# Patient Record
Sex: FEMALE | Race: ASIAN | NOT HISPANIC OR LATINO | Employment: FULL TIME | URBAN - METROPOLITAN AREA
[De-identification: names, ages, dates, MRNs, and addresses within clinical notes are randomized per-mention and may not be internally consistent; named-entity substitution may affect disease eponyms.]

---

## 2022-04-24 ENCOUNTER — HOSPITAL ENCOUNTER (EMERGENCY)
Facility: MEDICAL CENTER | Age: 27
End: 2022-04-25
Attending: EMERGENCY MEDICINE
Payer: COMMERCIAL

## 2022-04-24 DIAGNOSIS — R56.9 SEIZURE-LIKE ACTIVITY (HCC): ICD-10-CM

## 2022-04-24 DIAGNOSIS — G40.909 SEIZURE DISORDER (HCC): ICD-10-CM

## 2022-04-24 LAB
ALBUMIN SERPL BCP-MCNC: 4.9 G/DL (ref 3.2–4.9)
ALBUMIN/GLOB SERPL: 1.8 G/DL
ALP SERPL-CCNC: 53 U/L (ref 30–99)
ALT SERPL-CCNC: 7 U/L (ref 2–50)
ANION GAP SERPL CALC-SCNC: 15 MMOL/L (ref 7–16)
AST SERPL-CCNC: 16 U/L (ref 12–45)
BASOPHILS # BLD AUTO: 0.8 % (ref 0–1.8)
BASOPHILS # BLD: 0.07 K/UL (ref 0–0.12)
BILIRUB SERPL-MCNC: 0.2 MG/DL (ref 0.1–1.5)
BUN SERPL-MCNC: 11 MG/DL (ref 8–22)
CALCIUM SERPL-MCNC: 9.4 MG/DL (ref 8.5–10.5)
CHLORIDE SERPL-SCNC: 109 MMOL/L (ref 96–112)
CO2 SERPL-SCNC: 20 MMOL/L (ref 20–33)
CREAT SERPL-MCNC: 0.76 MG/DL (ref 0.5–1.4)
EOSINOPHIL # BLD AUTO: 0.16 K/UL (ref 0–0.51)
EOSINOPHIL NFR BLD: 1.9 % (ref 0–6.9)
ERYTHROCYTE [DISTWIDTH] IN BLOOD BY AUTOMATED COUNT: 42.8 FL (ref 35.9–50)
ETHANOL BLD-MCNC: 140.8 MG/DL (ref 0–10)
GFR SERPLBLD CREATININE-BSD FMLA CKD-EPI: 110 ML/MIN/1.73 M 2
GLOBULIN SER CALC-MCNC: 2.8 G/DL (ref 1.9–3.5)
GLUCOSE SERPL-MCNC: 91 MG/DL (ref 65–99)
HCT VFR BLD AUTO: 41.3 % (ref 37–47)
HGB BLD-MCNC: 13.7 G/DL (ref 12–16)
IMM GRANULOCYTES # BLD AUTO: 0.02 K/UL (ref 0–0.11)
IMM GRANULOCYTES NFR BLD AUTO: 0.2 % (ref 0–0.9)
LACTATE BLD-SCNC: 2.6 MMOL/L (ref 0.5–2)
LYMPHOCYTES # BLD AUTO: 3.49 K/UL (ref 1–4.8)
LYMPHOCYTES NFR BLD: 41.3 % (ref 22–41)
MCH RBC QN AUTO: 30.1 PG (ref 27–33)
MCHC RBC AUTO-ENTMCNC: 33.2 G/DL (ref 33.6–35)
MCV RBC AUTO: 90.8 FL (ref 81.4–97.8)
MONOCYTES # BLD AUTO: 0.34 K/UL (ref 0–0.85)
MONOCYTES NFR BLD AUTO: 4 % (ref 0–13.4)
NEUTROPHILS # BLD AUTO: 4.37 K/UL (ref 2–7.15)
NEUTROPHILS NFR BLD: 51.8 % (ref 44–72)
NRBC # BLD AUTO: 0 K/UL
NRBC BLD-RTO: 0 /100 WBC
PLATELET # BLD AUTO: 376 K/UL (ref 164–446)
PMV BLD AUTO: 10 FL (ref 9–12.9)
POTASSIUM SERPL-SCNC: 3.7 MMOL/L (ref 3.6–5.5)
PROT SERPL-MCNC: 7.7 G/DL (ref 6–8.2)
RBC # BLD AUTO: 4.55 M/UL (ref 4.2–5.4)
SODIUM SERPL-SCNC: 144 MMOL/L (ref 135–145)
WBC # BLD AUTO: 8.5 K/UL (ref 4.8–10.8)

## 2022-04-24 PROCEDURE — 83605 ASSAY OF LACTIC ACID: CPT

## 2022-04-24 PROCEDURE — A9270 NON-COVERED ITEM OR SERVICE: HCPCS | Performed by: EMERGENCY MEDICINE

## 2022-04-24 PROCEDURE — 82077 ASSAY SPEC XCP UR&BREATH IA: CPT

## 2022-04-24 PROCEDURE — 80053 COMPREHEN METABOLIC PANEL: CPT

## 2022-04-24 PROCEDURE — 99285 EMERGENCY DEPT VISIT HI MDM: CPT

## 2022-04-24 PROCEDURE — 85025 COMPLETE CBC W/AUTO DIFF WBC: CPT

## 2022-04-24 PROCEDURE — 36415 COLL VENOUS BLD VENIPUNCTURE: CPT

## 2022-04-24 PROCEDURE — 700102 HCHG RX REV CODE 250 W/ 637 OVERRIDE(OP): Performed by: EMERGENCY MEDICINE

## 2022-04-24 RX ORDER — LEVETIRACETAM 500 MG/1
2000 TABLET ORAL ONCE
Status: COMPLETED | OUTPATIENT
Start: 2022-04-24 | End: 2022-04-24

## 2022-04-24 RX ORDER — LEVETIRACETAM 250 MG/1
750 TABLET ORAL 2 TIMES DAILY
Status: SHIPPED | COMMUNITY
End: 2022-04-24 | Stop reason: SDUPTHER

## 2022-04-24 RX ORDER — LEVETIRACETAM 750 MG/1
750 TABLET ORAL 2 TIMES DAILY
Qty: 120 TABLET | Refills: 0 | Status: SHIPPED | OUTPATIENT
Start: 2022-04-24 | End: 2022-06-23

## 2022-04-24 RX ADMIN — LEVETIRACETAM 2000 MG: 500 TABLET, FILM COATED ORAL at 22:36

## 2022-04-25 ENCOUNTER — TELEPHONE (OUTPATIENT)
Dept: SCHEDULING | Facility: IMAGING CENTER | Age: 27
End: 2022-04-25

## 2022-04-25 VITALS
HEART RATE: 80 BPM | TEMPERATURE: 98 F | RESPIRATION RATE: 25 BRPM | OXYGEN SATURATION: 95 % | DIASTOLIC BLOOD PRESSURE: 65 MMHG | SYSTOLIC BLOOD PRESSURE: 95 MMHG | BODY MASS INDEX: 19.99 KG/M2 | HEIGHT: 65 IN | WEIGHT: 120 LBS

## 2022-04-25 NOTE — ED TRIAGE NOTES
"Chief Complaint   Patient presents with   • Seizure     Known seizure history, had a seizure tonight. Friend or family called 911, EMS reports patient post-ictal on their arrival.   Patient states she takes keppra but ran out of it 1 month ago.     ED Triage Vitals [04/24/22 2215]   Enc Vitals Group      Blood Pressure 119/73      Pulse 84      Respiration 18      Temperature 37 °C (98.6 °F)      Temp src Temporal      Pulse Oximetry 97 %      Weight 54.4 kg (120 lb)      Height 1.651 m (5' 5\")     Patient is currently A/Ox4. Slow to respond to questions but converses appropriately with RN otherwise. States that she also has had \"a lot\" of alcohol to drink tonight.  EMS reports family is on the way.  "

## 2022-04-25 NOTE — ED PROVIDER NOTES
ED Provider Note    CHIEF COMPLAINT  Chief Complaint   Patient presents with   • Seizure     Known seizure history, had a seizure tonight. Friend or family called 911, EMS reports patient post-ictal on their arrival.   Patient states she takes keppra but ran out of it 1 month ago.       HPI  Patient is a 26-year-old female with a history of seizure disorder not currently on antiepileptics who presents emergency room brought in by EMS personnel for apparent seizure-like activity.  Patient was apparently postictal on arrival of EMS personnel and a friend or family member called 911.  They were out drinking and she states that she used to take Keppra however ran out of it 1 month ago and has just relocated back to [place].  She is not currently established with a physician and had not gotten her Keppra represcribed.  Tonight she is unaware of any new traumatic injuries, she has no persistent headaches, chest pain and denies any exogenous drug use but does endorse drinking alcohol.  She is currently is having no pain complaints, she denies any other contributing factors or prodromal symptoms such as chest pain, shortness of breath or other preceding signals of her seizure.  She says this feels similar to when she had prior seizures and is interested in establishing with a physician and neurologist here in town that she has not been in [place] in approximately 5 years.    PPE Note: I personally donned full PPE for all patient encounters during this visit, including being clean-shaven with an N95 respirator mask, gloves, and goggles.     REVIEW OF SYSTEMS  See HPI for further details. All other systems are negative.     PAST MEDICAL HISTORY   has a past medical history of Seizure (HCC).    SOCIAL HISTORY  Social History     Tobacco Use   • Smoking status: Never Smoker   • Smokeless tobacco: Never Used   Substance and Sexual Activity   • Alcohol use: Yes     Comment: occasional   • Drug use: Never   • Sexual activity: Not on  "file     SURGICAL HISTORY  patient denies any surgical history    CURRENT MEDICATIONS  Home Medications     Reviewed by Jeremias Larson R.N. (Registered Nurse) on 04/24/22 at 2218  Med List Status: Complete   Medication Last Dose Status   levETIRAcetam (KEPPRA) 250 MG tablet 1 month Active              ALLERGIES  No Known Allergies    PHYSICAL EXAM  VITAL SIGNS: /61   Pulse 81   Temp 36.7 °C (98 °F) (Temporal)   Resp (!) 25   Ht 1.651 m (5' 5\")   Wt 54.4 kg (120 lb)   LMP 04/24/2022 (Within Days)   SpO2 92%   BMI 19.97 kg/m²   Pulse ox interpretation: I interpret this pulse ox as normal.  Genl: F sitting in chair comfortably, speaking clearly, appears in no acute distress  Head: NC/AT   ENT: Mucous membranes moist, posterior pharynx clear, uvula midline, nares patent bilaterally   Eyes: Normal sclera, pupils equal round reactive to light  Neck: Supple, FROM, no LAD appreciated  Pulmonary: Lungs are clear to auscultation bilaterally  Chest: No TTP  CV:  RRR, no murmur appreciated, pulses 2+ in both upper and lower extremities,  Abdomen: soft, NT/ND; no rebound/guarding, no masses palpated, no HSM  : no CVA or suprapubic tenderness  Musculoskeletal: Pain free ROM of the neck. Moving upper and lower extremities and spontaneous in coordinated fashion  Neuro: Mental Status: Speech fluent without errors. Follows all commands. No dysarthria or apraxia.  Cranial Nerves: Pupils equal round and reactive to light. Extraocular motion intact. Visual fields intact. No nystagmus. CN V1-V3 intact to light touch. No facial asymmetry. Hearing clinically intact bilaterally. Tongue protrusion midline. No uvular deviation. Normal shoulder shrug and head turn.  Motor:  RUE: 5/5 with hand , 5/5 with flexion at the elbow 5/5 with extension at the elbow  LUE: 5/5 with hand , 5/5 with flexion at the elbow 5/5 with extension at the elbow  RLE: 5/5 with leg raise, 5/5 with plantar flexion, 5/5 with dorsal " flexion  LLE: 5/5 with leg raise, 5/5 with plantar flexion, 5/5 with dorsal flexion  Sensation to light touch intact throughout, Reflexes 2+ Patellar tendons, No ataxia noted  Rapidly alternating movements without difficulty  Skin: No rash or lesions.  No pallor or jaundice.  No cyanosis.  Warm and dry.     DIAGNOSTIC STUDIES / PROCEDURES    LABS  Labs Reviewed   CBC WITH DIFFERENTIAL - Abnormal; Notable for the following components:       Result Value    MCHC 33.2 (*)     Lymphocytes 41.30 (*)     All other components within normal limits   LACTIC ACID - Abnormal; Notable for the following components:    Lactic Acid 2.6 (*)     All other components within normal limits   DIAGNOSTIC ALCOHOL - Abnormal; Notable for the following components:    Diagnostic Alcohol 140.8 (*)     All other components within normal limits   COMP METABOLIC PANEL   ESTIMATED GFR     RADIOLOGY  No orders to display     COURSE & MEDICAL DECISION MAKING  Pertinent Labs & Imaging studies reviewed. (See chart for details)    DDX:  Seizure: r/o breakthrough seizure, Psychogenic nonepileptic seizure, subtherapeutic  anticonvulsant levels, medication noncompliance, hypoglycemia, hyponatremia, metabolic abnormality    MDM    Initial evaluation at 2222:  Patient seen and evaluated for symptoms as described above.  She has stable vital signs, no recent fevers or reported illness and has a known history of seizure disorder.  She has been out drinking and there is no corroborating evidence for any recent trauma though none is readily apparent on clinical exam.  Initial lactate was noted to be elevated which could be consistent with either alcohol intoxication or possible seizure activity.  She was reported to be postictal and clearing and clinically this appears appropriate.  Lab work obtained for the broad differential as noted above.  At this time with no evidence of trauma established history and current noncompliance with her Keppra medication I do  not think advanced medical imaging is indicated.  She will be loaded with Keppra p.o., follow-up on labs and referrals will be placed for establishment of care    2330: Lab work is reviewed, lactate level slightly elevated at 2.6, consistent with possible seizure.  She does have an elevated alcohol level without drinking with friends.  During her period of observation she is allowed to clear and does not show any signs of clinical intoxication thereafter.  No gross electrolyte derangements, no concerning anemia or hypoglycemia.  Family member came to bedside, and does not have other ongoing concerns and will help the patient get her prescriptions and take her to appointments.  Questions are addressed and they feel comfortable going home at this time.    FINAL IMPRESSION  Visit Diagnoses     ICD-10-CM   1. Seizure-like activity (HCC)  R56.9   2. Seizure disorder (HCC)  G40.909     Electronically signed by: Sergio Berumen M.D., 4/24/2022 10:22 PM

## 2022-04-25 NOTE — ED NOTES
Written and verbal discharge instructions given to patient. Patient acknowledges and reports understanding of instructions.  Patient is agreeable to discharge at this time.  D/c to home with sister.

## 2022-04-29 ENCOUNTER — OFFICE VISIT (OUTPATIENT)
Dept: MEDICAL GROUP | Facility: IMAGING CENTER | Age: 27
End: 2022-04-29
Attending: EMERGENCY MEDICINE
Payer: COMMERCIAL

## 2022-04-29 VITALS
HEART RATE: 111 BPM | HEIGHT: 65 IN | OXYGEN SATURATION: 96 % | DIASTOLIC BLOOD PRESSURE: 52 MMHG | WEIGHT: 126 LBS | TEMPERATURE: 98.4 F | SYSTOLIC BLOOD PRESSURE: 98 MMHG | BODY MASS INDEX: 20.99 KG/M2

## 2022-04-29 DIAGNOSIS — G40.909 SEIZURE DISORDER (HCC): ICD-10-CM

## 2022-04-29 DIAGNOSIS — L98.8 SKIN LESION OF BREAST: ICD-10-CM

## 2022-04-29 DIAGNOSIS — Z13.79 GENETIC TESTING: ICD-10-CM

## 2022-04-29 DIAGNOSIS — N63.0 LUMP OR MASS IN BREAST: ICD-10-CM

## 2022-04-29 DIAGNOSIS — Z76.89 ENCOUNTER TO ESTABLISH CARE: ICD-10-CM

## 2022-04-29 PROBLEM — R56.9 SEIZURE (HCC): Status: ACTIVE | Noted: 2022-04-29

## 2022-04-29 PROCEDURE — 99204 OFFICE O/P NEW MOD 45 MIN: CPT

## 2022-04-29 ASSESSMENT — ANXIETY QUESTIONNAIRES
7. FEELING AFRAID AS IF SOMETHING AWFUL MIGHT HAPPEN: NOT AT ALL
1. FEELING NERVOUS, ANXIOUS, OR ON EDGE: NOT AT ALL
2. NOT BEING ABLE TO STOP OR CONTROL WORRYING: NOT AT ALL
6. BECOMING EASILY ANNOYED OR IRRITABLE: NOT AT ALL
3. WORRYING TOO MUCH ABOUT DIFFERENT THINGS: NOT AT ALL
4. TROUBLE RELAXING: NOT AT ALL
GAD7 TOTAL SCORE: 0
5. BEING SO RESTLESS THAT IT IS HARD TO SIT STILL: NOT AT ALL

## 2022-04-29 ASSESSMENT — FIBROSIS 4 INDEX: FIB4 SCORE: 0.43

## 2022-04-29 ASSESSMENT — PAIN SCALES - GENERAL: PAINLEVEL: NO PAIN

## 2022-04-29 ASSESSMENT — PATIENT HEALTH QUESTIONNAIRE - PHQ9: CLINICAL INTERPRETATION OF PHQ2 SCORE: 0

## 2022-04-29 NOTE — PROGRESS NOTES
" CC:  Establish care and follow up after ED visit    HISTORY OF THE PRESENT ILLNESS: Patient is a 26 y.o. female. This pleasant patient is here today to establish care, follow-up after ED visit, and to discuss:    Establish care  Patient is here to establish care. She recently moved back here from [place] 2 months ago.    Seizure disorder  Patient reports to having epilepsy/seizures since age 17. She takes Keppra for this but admits to inconsistent use of med stating that she would at times only take half the dose prescribed, forgets to take them, or will have to distribute the dose in order to last prior to running out. She has not been taking her Keppra for 1 month which resulted in an ED visit on 4/24 for seizure activity. She was prescribed Keppra 750 mg BID. She reports that current dose might be too much stating \"my head feels heavy and groggy.\" She has pending referral to Neurology.    Breast lumps on both breasts  Skin lesion of breast  Patient reports of being told of having with \"breast lumps\" on both breast at age 17. She was informed that it was benign but was recommended to have follow up ultrasound for surveillance. She has not done so since. Today, she reports of noticing \"white drainage\" on her sports bra from both breasts. She also reports of bilateral aerola pigmentation darkening since birth.   Patient has noticed a \"dark spot on her left nipple\" 4 years that appears to be increasing in size.   She reports to having fevers and chills frequently attributing this from her child being sick all the time.   She denies breast pain, redness, swelling, malaise, unintentional weight changes.     US Breast result 4/16/2014  Impression  1.  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   2.  Treatments options include short-term followup, biopsy, or surgical removal.   3.  Tentatively short-term ultrasound followup in 6 months is recommended.   These results were given to the patient at the " time of visit.   R3 - Category 3:  Probably Benign Finding - Short Interval Follow-Up Suggested    Allergies: Patient has no known allergies.    Current Outpatient Medications Ordered in Epic   Medication Sig Dispense Refill   • levETIRAcetam (KEPPRA) 750 MG tablet Take 1 Tablet by mouth 2 times a day for 60 days. 120 Tablet 0     No current Epic-ordered facility-administered medications on file.       Past Medical History:   Diagnosis Date   • Epilepsy (HCC)    • Seizure (HCC)        History reviewed. No pertinent surgical history.    Social History     Tobacco Use   • Smoking status: Former Smoker     Quit date: 2018     Years since quittin.0   • Smokeless tobacco: Never Used   • Tobacco comment: quit about 5 years ago   Vaping Use   • Vaping Use: Every day   • Substances: Flavoring   Substance Use Topics   • Alcohol use: Yes     Comment: occasional   • Drug use: Never       Social History     Social History Narrative   • Not on file       Family History   Problem Relation Age of Onset   • Cancer Mother         breast   • Diabetes Sister    • Hypertension Sister    • Colorectal Cancer Neg Hx    • Peritoneal Cancer Neg Hx    • Tubal Cancer Neg Hx    • Ovarian Cancer Neg Hx      ROS: per HPI  CONS:     No fever, no chills, no weight loss   EYES:      No diplopia, no blurry vision, no redness of eyes, no swelling of eyelids   ENT:    No hearing loss, No ear pain, No sore throat, no dysphagia, no neck swelling   CV:     No chest pain, no palpitations, no claudication, no orthopnea, no PND   PULM:    No SOB, no cough, no hemoptysis, no wheezing    GI:   No nausea, no vomiting, no diarrhea, no constipation, no bloody stools   :  Passing urine well, no dysuria, no hematuria   ENDO:   No polyuria, no polydipsia, no heat intolerance, no cold intolerance   NEURO: No headaches, no dizziness, no tremors   MUSC:  No joint swellings, no arthralgias, no myalgias, no weakness   SKIN:   No rash, no ulcers, no dry skin,  "+bilateral breast lumps with white drainage, left nipple dark spot   PSYCH:   No depression, no anxiety, no difficulty sleeping     Exam: BP (!) 98/52 (BP Location: Left arm, Patient Position: Sitting, BP Cuff Size: Adult)   Pulse (!) 111   Temp 36.9 °C (98.4 °F) (Temporal)   Ht 1.651 m (5' 5\")   Wt 57.2 kg (126 lb)   SpO2 96%  Body mass index is 20.97 kg/m².    General: Normal appearing. No distress.  HEENT: Normocephalic. Eyes conjunctiva clear lids without ptosis, pupils equal and reactive to light accommodation, ears normal shape and contour, canals are clear bilaterally, nasal mucosa benign, oropharynx is without erythema, edema or exudates.   Neck: Supple. Thyroid is not enlarged.  Pulmonary: Clear to ausculation.  Normal effort. No rales, ronchi, or wheezing.  Cardiovascular: Regular rate and rhythm without murmur. Carotid and radial pulses are intact and equal bilaterally.  Right breast: lumpy breast tissue, possibly-palpated 2 mobile nodules, no drainage to nipple area during breast exam, no tenderness to palpation.  Left breast: lumpy breast tissue, no drainage to nipple during breast exam, +tender with palpation. Dark brown papule on left areola 12 o'clock position.   Abdomen: Soft, nontender, nondistended. Normal bowel sounds.   Neurologic: Grossly nonfocal  Lymph: No cervical, supraclavicular or axillary lymph nodes are palpable  Skin: Warm and dry.    Musculoskeletal: Normal gait. No extremity cyanosis, clubbing, or edema.  Psych: Normal mood and affect. Alert and oriented x3. Judgment and insight is normal.    Please note that this dictation was created using voice recognition software. I have made every reasonable attempt to correct obvious errors, but I expect that there are errors of grammar and possibly content that I did not discover before finalizing the note.      Assessment/Plan    26 y.o. female with the following     1. Encounter to establish care    2. Seizure disorder (HCC)  This is a " chronic issue for patient and takes Keppra for this. Patient states current dose of 750mg BID makes her groggy as she has not been taking her Keppra consistently. Advised patient to start with 750mg daily for 1 week and increase to 2 tablets twice daily afterwards. Advised patient to establish with Neuro, referral pending at this time. Advised patient to avoid alcohol use with Keprra.   Advised to follow up for worsening symptoms. ER signs and symptoms discussed.    3. Lump or mass in breast  Established issue. 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 She has not followed up since initial diagnosis in 2014. Ultrasound bilateral breast ordered. Will follow-up.    - US-LOCALIZATION BREAST SINGLE LEFT; Future  - US-LOCALIZATION BREAST SINGLE RIGHT; Future    4. Skin lesion of breast  Known issue, appears to be increasing in size. Melanocytic nevi vs Atypical nevi vs Melanoma. Referral to derm for further evaluation.     - Referral to Dermatology    5. Genetic testing  - Referral to Genetic Research Studies    Referral for genetic research was offered. Patient accepted.    Medical Decision Making/Course:  In the course of preparing for this visit with review of the pertinent past medical history, recent and past clinic visits, current medications, and performing chart, immunization, medical history and medication reconciliation, and in the further course of obtaining the current history pertinent to the clinic visit today, performing an exam and evaluation, ordering and independently evaluating labs, tests, and/or procedures, prescribing any recommended new medications as noted above, providing any pertinent counseling and education and recommending further coordination of care. This was discussed with patient in a shared-decision making conversation, and they understand and agreed with plan of care.      Return in about 2 months (around 6/29/2022) for pap.     Thank you, Macarena NEGRETE  [facility]        Please note that this dictation was created using voice recognition software. I have made every reasonable attempt to correct obvious errors, but I expect that there are errors of grammar and possibly content that I did not discover before finalizing the note.

## 2022-04-29 NOTE — LETTER
[employer]  Macarena Higuera V, A.P.R.N.  [address]   [place] [address]  Fax: [phone]   Authorization for Release/Disclosure of   Protected Health Information   Name: VIVIANE HOLGUIN : 1995 SSN: [id number]   Address: [address] [place] [address]  [address]  [place] [address] Phone:    [phone] (home)    I authorize the entity listed below to release/disclose the PHI below to:   [employer]/Macarena Higuera V, A.P.R.N. and Macarena Higuera V, A.P.R.N.   Provider or Entity Name:  [facility]   Address   City, [facility], [facility]   Phone:      Fax:     Reason for request: continuity of care   Information to be released:    [  ] LAST COLONOSCOPY,  including any PATH REPORT and follow-up  [  ] LAST FIT/COLOGUARD RESULT [  ] LAST DEXA  [  ] LAST MAMMOGRAM  [  ] LAST PAP  [  ] LAST LABS [  ] RETINA EXAM REPORT  [  ] IMMUNIZATION RECORDS  [  ] Release all info      [  ] Check here and initial the line next to each item to release ALL health information INCLUDING  _____ Care and treatment for drug and / or alcohol abuse  _____ HIV testing, infection status, or AIDS  _____ Genetic Testing    DATES OF SERVICE OR TIME PERIOD TO BE DISCLOSED: _____________  I understand and acknowledge that:  * This Authorization may be revoked at any time by you in writing, except if your health information has already been used or disclosed.  * Your health information that will be used or disclosed as a result of you signing this authorization could be re-disclosed by the recipient. If this occurs, your re-disclosed health information may no longer be protected by State or Federal laws.  * You may refuse to sign this Authorization. Your refusal will not affect your ability to obtain treatment.  * This Authorization becomes effective upon signing and will  on (date) __________.      If no date is indicated, this Authorization will  one (1) year from the signature date.    Name: Viviane Hernandez  [place]    Signature:   Date:     4/29/2022       PLEASE FAX REQUESTED RECORDS BACK TO: [phone]

## 2022-05-05 ENCOUNTER — RESEARCH ENCOUNTER (OUTPATIENT)
Dept: RESEARCH | Facility: WORKSITE | Age: 27
End: 2022-05-05
Payer: COMMERCIAL

## 2022-05-05 DIAGNOSIS — Z00.6 RESEARCH STUDY PATIENT: Primary | ICD-10-CM

## 2022-05-12 ENCOUNTER — OFFICE VISIT (OUTPATIENT)
Dept: MEDICAL GROUP | Facility: IMAGING CENTER | Age: 27
End: 2022-05-12
Payer: COMMERCIAL

## 2022-05-12 VITALS
WEIGHT: 126.6 LBS | HEART RATE: 97 BPM | HEIGHT: 65 IN | DIASTOLIC BLOOD PRESSURE: 70 MMHG | OXYGEN SATURATION: 98 % | SYSTOLIC BLOOD PRESSURE: 112 MMHG | TEMPERATURE: 97.5 F | BODY MASS INDEX: 21.09 KG/M2 | RESPIRATION RATE: 14 BRPM

## 2022-05-12 DIAGNOSIS — R22.1 NECK NODULE: ICD-10-CM

## 2022-05-12 PROBLEM — G40.909 EPILEPTIC SEIZURE (HCC): Status: ACTIVE | Noted: 2022-05-11

## 2022-05-12 PROCEDURE — 99212 OFFICE O/P EST SF 10 MIN: CPT

## 2022-05-12 ASSESSMENT — ENCOUNTER SYMPTOMS
DOUBLE VISION: 0
PHOTOPHOBIA: 0
RESPIRATORY NEGATIVE: 1
CONSTITUTIONAL NEGATIVE: 1
ABDOMINAL PAIN: 0
SORE THROAT: 0
SINUS PAIN: 0
CARDIOVASCULAR NEGATIVE: 1
PSYCHIATRIC NEGATIVE: 1
BLURRED VISION: 0
NAUSEA: 0
VOMITING: 0
NECK PAIN: 0
NEUROLOGICAL NEGATIVE: 1

## 2022-05-12 ASSESSMENT — FIBROSIS 4 INDEX: FIB4 SCORE: 0.43

## 2022-05-12 NOTE — PROGRESS NOTES
"Subjective     Viviane Christian is a 26 y.o. female who presents with Lump (Right side, noticed very recently )    HPI     Patient reports of her coworkers noticing a lump, the size of a rice grain on the right side of her neck yesterday.  Patient reports that she has never noticed this before. No alleviating or exacerbating factors identified. Patient reports to having her right wisdom tooth taken out yesterday.  Patient denies any pain, redness, swelling, or drainage to the area.  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  Patient denies recent trauma, cat scratch, fevers, chills, fatigue, malaise, or unintentional weight loss.      Review of Systems   Constitutional: Negative.    HENT: Negative for congestion, ear discharge, ear pain, hearing loss, sinus pain, sore throat and tinnitus.    Eyes: Negative for blurred vision, double vision and photophobia.   Respiratory: Negative.    Cardiovascular: Negative.    Gastrointestinal: Negative for abdominal pain, nausea and vomiting.   Musculoskeletal: Negative for neck pain.   Skin: Negative.    Neurological: Negative.    Endo/Heme/Allergies: Negative.    Psychiatric/Behavioral: Negative.           Objective     /70   Pulse 97   Temp 36.4 °C (97.5 °F)   Resp 14   Ht 1.651 m (5' 5\")   Wt 57.4 kg (126 lb 9.6 oz)   LMP 04/24/2022 (Within Days)   SpO2 98%   BMI 21.07 kg/m²      Physical Exam  HENT:      Head:        Comments: <5mm nodule, mobile, soft, non-tender to palpation  Musculoskeletal:      Cervical back: Normal range of motion.     General: Normal appearing. No distress.  HEENT: Normocephalic. Eyes conjunctiva clear lids without ptosis,nasal mucosa benign, oropharynx is without erythema, edema or exudates.   Neck: Supple without JVD or bruit. Thyroid is not enlarged. Fullness bilateral sternocleidomastoid  Pulmonary: Clear to ausculation.  Normal " effort. No rales, ronchi, or wheezing.  Cardiovascular: Regular rate and rhythm without murmur. Carotid and radial pulses are intact and equal bilaterally.  Abdomen: Soft, nontender, nondistended. Normal bowel sounds. Liver and spleen are not palpable  Neurologic: Grossly nonfocal  Lymph: Right posterior cervical lymph node palpable, no supraclavicular lymph nodes are palpable  Skin: Warm and dry.  No obvious lesions.  Musculoskeletal: Normal gait. No extremity cyanosis, clubbing, or edema.  Psych: Normal mood and affect. Alert and oriented x3. Judgment and insight is normal.      Assessment & Plan       1. Neck nodule  New issue. Patient's presentation and examination is possibly a slightly enlarged posterior cervical lymph node from recent wisdom tooth removal vs benign cyst.  Patient in appears nontoxic. No concerns for immediate urgency that would warrant imaging at this point. Advised patient to monitor closely. If nodule changes or worsens, advised patient to follow-up and will order ultrasound. Patient is scheduled to be seen in our office on 6/10/2022, will re-assess at that time.     Medical Decision Making/Course:  In the course of preparing for this visit with review of the pertinent past medical history, recent and past clinic visits, current medications, and performing chart, immunization, medical history and medication reconciliation, and in the further course of obtaining the current history pertinent to the clinic visit today, performing an exam and evaluation, ordering and independently evaluating labs, tests, and/or procedures, prescribing any recommended new medications as noted above, providing any pertinent counseling and education and recommending further coordination of care. This was discussed with patient in a shared-decision making conversation, and they understand and agreed with plan of care.    Thank you, Macarena NEGRETE  [facility]

## 2022-05-12 NOTE — PROGRESS NOTES
CC:  There were no encounter diagnoses.    HISTORY OF THE PRESENT ILLNESS: Patient is a 26 y.o. female. This pleasant patient is here today ***    Health Maintenance: {COMPLETED:470159}    Seizures  Patient on Keprra  Patient s/p ED visit 22 for post-ictal seizure activity secondary to not taking Keppra as her prescription ran out x1 month prior.    Yesterday lump on her neck right side  Never noticed before  unahcanged size  No recent trauma, fever, chills, malaise,     No pain, redness, swelling, drainage      No problem-specific Assessment & Plan notes found for this encounter.    Allergies: Patient has no known allergies.    Current Outpatient Medications Ordered in Epic   Medication Sig Dispense Refill   • levETIRAcetam (KEPPRA) 750 MG tablet Take 1 Tablet by mouth 2 times a day for 60 days. 120 Tablet 0     No current Epic-ordered facility-administered medications on file.       Past Medical History:   Diagnosis Date   • Epilepsy (HCC)    • Seizure (HCC)        No past surgical history on file.    Social History     Tobacco Use   • Smoking status: Former Smoker     Quit date: 2018     Years since quittin.0   • Smokeless tobacco: Never Used   • Tobacco comment: quit about 5 years ago   Vaping Use   • Vaping Use: Every day   • Substances: Flavoring   Substance Use Topics   • Alcohol use: Yes     Comment: occasional   • Drug use: Never       Social History     Social History Narrative   • Not on file       Family History   Problem Relation Age of Onset   • Cancer Mother         breast   • Diabetes Sister    • Hypertension Sister    • Colorectal Cancer Neg Hx    • Peritoneal Cancer Neg Hx    • Tubal Cancer Neg Hx    • Ovarian Cancer Neg Hx        ROS:     - Constitutional:*** Negative for fever, chills, unexpected weight change, and fatigue/generalized weakness.     - HEENT***: Negative for headaches, vision changes, hearing changes, ear pain, ear discharge, rhinorrhea, sinus congestion, sore throat,  "and neck pain.      - Respiratory:*** Negative for cough, sputum production, chest congestion, dyspnea, wheezing, and crackles.      - Cardiovascular:*** Negative for chest pain, palpitations, orthopnea, and bilateral lower extremity edema.     - Gastrointestinal:*** Negative for heartburn, nausea, vomiting, abdominal pain, hematochezia, melena, diarrhea, constipation, and greasy/foul-smelling stools.     - Genitourinary:*** Negative for dysuria, polyuria, hematuria, pyuria, urinary urgency, and urinary incontinence.     - Musculoskeletal:*** Negative for myalgias, back pain, and joint pain.     - Skin:*** Negative for rash, itching, cyanotic skin color change.     - Neurological:*** Negative for dizziness, tingling, tremors, focal sensory deficit, focal weakness and headaches.     - Endo/Heme/Allergies:*** Does not bruise/bleed easily.     - Psychiatric/Behavioral: ***Negative for depression, suicidal/homicidal ideation and memory loss.      {ROSALLOTHERSNE}      .    ***  Exam: Ht 1.651 m (5' 5\")   Wt 57.4 kg (126 lb 9.6 oz)  Body mass index is 21.07 kg/m².    General: Normal appearing. No distress.  HEENT: Normocephalic. Eyes conjunctiva clear lids without ptosis, pupils equal and reactive to light accommodation, ears normal shape and contour, canals are clear bilaterally, tympanic membranes are benign, nasal mucosa benign, oropharynx is without erythema, edema or exudates.   Neck: Supple without JVD or bruit. Thyroid is not enlarged.  Pulmonary: Clear to ausculation.  Normal effort. No rales, ronchi, or wheezing.  Cardiovascular: Regular rate and rhythm without murmur. Carotid and radial pulses are intact and equal bilaterally.  Abdomen: Soft, nontender, nondistended. Normal bowel sounds. Liver and spleen are not palpable  Neurologic: Grossly nonfocal  Lymph: No cervical, supraclavicular or axillary lymph nodes are palpable  Skin: Warm and dry.  No obvious lesions.  Musculoskeletal: Normal gait. No " extremity cyanosis, clubbing, or edema.  Psych: Normal mood and affect. Alert and oriented x3. Judgment and insight is normal.  ***  Please note that this dictation was created using voice recognition software. I have made every reasonable attempt to correct obvious errors, but I expect that there are errors of grammar and possibly content that I did not discover before finalizing the note.      Assessment/Plan    26 y.o. female with the following -    There are no diagnoses linked to this encounter.    Referral for genetic research was offered. Patient {declined/accepted}.    I spent a total of *** minutes with record review, exam, communication with the patient, communication with other providers, and documentation of this encounter.    No follow-ups on file.    Please note that this dictation was created using voice recognition software. I have made every reasonable attempt to correct obvious errors, but I expect that there are errors of grammar and possibly content that I did not discover before finalizing the note.

## 2022-06-06 ENCOUNTER — APPOINTMENT (RX ONLY)
Dept: URBAN - METROPOLITAN AREA CLINIC 4 | Facility: CLINIC | Age: 27
Setting detail: DERMATOLOGY
End: 2022-06-06

## 2022-06-06 DIAGNOSIS — L81.4 OTHER MELANIN HYPERPIGMENTATION: ICD-10-CM

## 2022-06-06 DIAGNOSIS — Z71.89 OTHER SPECIFIED COUNSELING: ICD-10-CM

## 2022-06-06 DIAGNOSIS — D22 MELANOCYTIC NEVI: ICD-10-CM

## 2022-06-06 PROBLEM — D22.5 MELANOCYTIC NEVI OF TRUNK: Status: ACTIVE | Noted: 2022-06-06

## 2022-06-06 PROBLEM — D48.5 NEOPLASM OF UNCERTAIN BEHAVIOR OF SKIN: Status: ACTIVE | Noted: 2022-06-06

## 2022-06-06 PROBLEM — D22.39 MELANOCYTIC NEVI OF OTHER PARTS OF FACE: Status: ACTIVE | Noted: 2022-06-06

## 2022-06-06 PROBLEM — D22.71 MELANOCYTIC NEVI OF RIGHT LOWER LIMB, INCLUDING HIP: Status: ACTIVE | Noted: 2022-06-06

## 2022-06-06 PROBLEM — D22.62 MELANOCYTIC NEVI OF LEFT UPPER LIMB, INCLUDING SHOULDER: Status: ACTIVE | Noted: 2022-06-06

## 2022-06-06 PROBLEM — D22.61 MELANOCYTIC NEVI OF RIGHT UPPER LIMB, INCLUDING SHOULDER: Status: ACTIVE | Noted: 2022-06-06

## 2022-06-06 PROCEDURE — ? ADDITIONAL NOTES

## 2022-06-06 PROCEDURE — 99203 OFFICE O/P NEW LOW 30 MIN: CPT

## 2022-06-06 PROCEDURE — ? COUNSELING

## 2022-06-06 ASSESSMENT — LOCATION SIMPLE DESCRIPTION DERM
LOCATION SIMPLE: INFERIOR FOREHEAD
LOCATION SIMPLE: LEFT HAND
LOCATION SIMPLE: UPPER BACK
LOCATION SIMPLE: RIGHT FOREHEAD
LOCATION SIMPLE: LEFT BREAST
LOCATION SIMPLE: LEFT UPPER ARM
LOCATION SIMPLE: RIGHT HAND
LOCATION SIMPLE: ABDOMEN
LOCATION SIMPLE: PLANTAR SURFACE OF RIGHT 5TH TOE
LOCATION SIMPLE: RIGHT UPPER ARM

## 2022-06-06 ASSESSMENT — LOCATION ZONE DERM
LOCATION ZONE: FACE
LOCATION ZONE: TRUNK
LOCATION ZONE: HAND
LOCATION ZONE: ARM
LOCATION ZONE: TOE

## 2022-06-06 ASSESSMENT — LOCATION DETAILED DESCRIPTION DERM
LOCATION DETAILED: RIGHT LATERAL PLANTAR 5TH TOE
LOCATION DETAILED: RIGHT INFERIOR MEDIAL FOREHEAD
LOCATION DETAILED: INFERIOR MID FOREHEAD
LOCATION DETAILED: RIGHT RADIAL DORSAL HAND
LOCATION DETAILED: LEFT RADIAL DORSAL HAND
LOCATION DETAILED: LEFT ANTERIOR DISTAL UPPER ARM
LOCATION DETAILED: RIGHT ANTERIOR DISTAL UPPER ARM
LOCATION DETAILED: EPIGASTRIC SKIN
LOCATION DETAILED: LEFT NIPPLE
LOCATION DETAILED: SUPERIOR THORACIC SPINE

## 2022-06-06 NOTE — HPI: SKIN LESION
Is This A New Presentation, Or A Follow-Up?: Mole
What Type Of Note Output Would You Prefer (Optional)?: Standard Output
How Severe Is Your Skin Lesion?: mild
Has Your Skin Lesion Been Treated?: not been treated
Additional History: Patient reports first noting this area 5 years ago. She has not noted any change though her PCP noted this on examination and referred for evaluation. She reports that the area is larger since its inception. Asymptomatic. Mom had melanoma, uncertain of age. Grandmother had breast cancer. Father is Brazilian.
Which Family Member (Optional)?: Mom

## 2022-06-06 NOTE — HPI: FULL BODY SKIN EXAMINATION
How Severe Are Your Spot(S)?: mild
What Type Of Note Output Would You Prefer (Optional)?: Standard Output
What Is The Reason For Today's Visit?: Full Body Skin Examination
What Is The Reason For Today's Visit? (Being Monitored For X): concerning skin lesions on an annual basis
Additional History: Grandma had breast cancer. No fam hx of pancreatic cancer.

## 2022-06-06 NOTE — PROCEDURE: ADDITIONAL NOTES
Render Risk Assessment In Note?: no
Detail Level: Simple
Additional Notes: Advised to come back in 4 months to check on this spot again and to come back sooner if it changes.
Additional Notes: Discussed with the patient that I favor a blue nevus of the left nipple though anytime we have blue in a skin lesion then we always need to monitor for change. We also discussed that the longstanding nature with no noted progression is also reassuring. We decided to monitor and will we re-check in 4 months. If any change we will refer to plastics for biopsy. Discussed if she notes any change prior to contact me.

## 2022-06-08 SDOH — ECONOMIC STABILITY: TRANSPORTATION INSECURITY
IN THE PAST 12 MONTHS, HAS THE LACK OF TRANSPORTATION KEPT YOU FROM MEDICAL APPOINTMENTS OR FROM GETTING MEDICATIONS?: NO

## 2022-06-08 SDOH — ECONOMIC STABILITY: HOUSING INSECURITY
IN THE LAST 12 MONTHS, WAS THERE A TIME WHEN YOU DID NOT HAVE A STEADY PLACE TO SLEEP OR SLEPT IN A SHELTER (INCLUDING NOW)?: NO

## 2022-06-08 SDOH — ECONOMIC STABILITY: FOOD INSECURITY: WITHIN THE PAST 12 MONTHS, THE FOOD YOU BOUGHT JUST DIDN'T LAST AND YOU DIDN'T HAVE MONEY TO GET MORE.: NEVER TRUE

## 2022-06-08 SDOH — ECONOMIC STABILITY: HOUSING INSECURITY

## 2022-06-08 SDOH — HEALTH STABILITY: PHYSICAL HEALTH: ON AVERAGE, HOW MANY MINUTES DO YOU ENGAGE IN EXERCISE AT THIS LEVEL?: 80 MIN

## 2022-06-08 SDOH — HEALTH STABILITY: MENTAL HEALTH
STRESS IS WHEN SOMEONE FEELS TENSE, NERVOUS, ANXIOUS, OR CAN'T SLEEP AT NIGHT BECAUSE THEIR MIND IS TROUBLED. HOW STRESSED ARE YOU?: VERY MUCH

## 2022-06-08 SDOH — ECONOMIC STABILITY: INCOME INSECURITY: IN THE LAST 12 MONTHS, WAS THERE A TIME WHEN YOU WERE NOT ABLE TO PAY THE MORTGAGE OR RENT ON TIME?: NO

## 2022-06-08 SDOH — HEALTH STABILITY: PHYSICAL HEALTH: ON AVERAGE, HOW MANY DAYS PER WEEK DO YOU ENGAGE IN MODERATE TO STRENUOUS EXERCISE (LIKE A BRISK WALK)?: 4 DAYS

## 2022-06-08 SDOH — ECONOMIC STABILITY: FOOD INSECURITY: WITHIN THE PAST 12 MONTHS, YOU WORRIED THAT YOUR FOOD WOULD RUN OUT BEFORE YOU GOT MONEY TO BUY MORE.: NEVER TRUE

## 2022-06-08 SDOH — ECONOMIC STABILITY: TRANSPORTATION INSECURITY
IN THE PAST 12 MONTHS, HAS LACK OF TRANSPORTATION KEPT YOU FROM MEETINGS, WORK, OR FROM GETTING THINGS NEEDED FOR DAILY LIVING?: NO

## 2022-06-08 SDOH — ECONOMIC STABILITY: TRANSPORTATION INSECURITY
IN THE PAST 12 MONTHS, HAS LACK OF RELIABLE TRANSPORTATION KEPT YOU FROM MEDICAL APPOINTMENTS, MEETINGS, WORK OR FROM GETTING THINGS NEEDED FOR DAILY LIVING?: NO

## 2022-06-08 SDOH — ECONOMIC STABILITY: INCOME INSECURITY: HOW HARD IS IT FOR YOU TO PAY FOR THE VERY BASICS LIKE FOOD, HOUSING, MEDICAL CARE, AND HEATING?: NOT VERY HARD

## 2022-06-08 ASSESSMENT — SOCIAL DETERMINANTS OF HEALTH (SDOH)
HOW OFTEN DO YOU HAVE SIX OR MORE DRINKS ON ONE OCCASION: NEVER
WITHIN THE PAST 12 MONTHS, YOU WORRIED THAT YOUR FOOD WOULD RUN OUT BEFORE YOU GOT THE MONEY TO BUY MORE: NEVER TRUE
IN A TYPICAL WEEK, HOW MANY TIMES DO YOU TALK ON THE PHONE WITH FAMILY, FRIENDS, OR NEIGHBORS?: MORE THAN THREE TIMES A WEEK
HOW OFTEN DO YOU ATTENT MEETINGS OF THE CLUB OR ORGANIZATION YOU BELONG TO?: NEVER
HOW HARD IS IT FOR YOU TO PAY FOR THE VERY BASICS LIKE FOOD, HOUSING, MEDICAL CARE, AND HEATING?: NOT VERY HARD
HOW OFTEN DO YOU ATTEND CHURCH OR RELIGIOUS SERVICES?: NEVER
HOW OFTEN DO YOU GET TOGETHER WITH FRIENDS OR RELATIVES?: TWICE A WEEK
ARE YOU MARRIED, WIDOWED, DIVORCED, SEPARATED, NEVER MARRIED, OR LIVING WITH A PARTNER?: NEVER MARRIED
IN A TYPICAL WEEK, HOW MANY TIMES DO YOU TALK ON THE PHONE WITH FAMILY, FRIENDS, OR NEIGHBORS?: MORE THAN THREE TIMES A WEEK
HOW MANY DRINKS CONTAINING ALCOHOL DO YOU HAVE ON A TYPICAL DAY WHEN YOU ARE DRINKING: 3 OR 4
HOW OFTEN DO YOU ATTEND CHURCH OR RELIGIOUS SERVICES?: NEVER
DO YOU BELONG TO ANY CLUBS OR ORGANIZATIONS SUCH AS CHURCH GROUPS UNIONS, FRATERNAL OR ATHLETIC GROUPS, OR SCHOOL GROUPS?: NO
HOW OFTEN DO YOU GET TOGETHER WITH FRIENDS OR RELATIVES?: TWICE A WEEK
HOW OFTEN DO YOU HAVE A DRINK CONTAINING ALCOHOL: 2-4 TIMES A MONTH
HOW OFTEN DO YOU ATTENT MEETINGS OF THE CLUB OR ORGANIZATION YOU BELONG TO?: NEVER
DO YOU BELONG TO ANY CLUBS OR ORGANIZATIONS SUCH AS CHURCH GROUPS UNIONS, FRATERNAL OR ATHLETIC GROUPS, OR SCHOOL GROUPS?: NO
ARE YOU MARRIED, WIDOWED, DIVORCED, SEPARATED, NEVER MARRIED, OR LIVING WITH A PARTNER?: NEVER MARRIED

## 2022-06-08 ASSESSMENT — LIFESTYLE VARIABLES
HOW OFTEN DO YOU HAVE SIX OR MORE DRINKS ON ONE OCCASION: NEVER
HOW OFTEN DO YOU HAVE A DRINK CONTAINING ALCOHOL: 2-4 TIMES A MONTH
SKIP TO QUESTIONS 9-10: 0
HOW MANY STANDARD DRINKS CONTAINING ALCOHOL DO YOU HAVE ON A TYPICAL DAY: 3 OR 4
AUDIT-C TOTAL SCORE: 3

## 2022-06-10 ENCOUNTER — OFFICE VISIT (OUTPATIENT)
Dept: MEDICAL GROUP | Facility: IMAGING CENTER | Age: 27
End: 2022-06-10
Payer: COMMERCIAL

## 2022-06-10 ENCOUNTER — HOSPITAL ENCOUNTER (OUTPATIENT)
Facility: MEDICAL CENTER | Age: 27
End: 2022-06-10
Payer: COMMERCIAL

## 2022-06-10 VITALS
OXYGEN SATURATION: 100 % | HEIGHT: 65 IN | RESPIRATION RATE: 16 BRPM | BODY MASS INDEX: 20.76 KG/M2 | DIASTOLIC BLOOD PRESSURE: 70 MMHG | TEMPERATURE: 98.6 F | HEART RATE: 86 BPM | WEIGHT: 124.6 LBS | SYSTOLIC BLOOD PRESSURE: 110 MMHG

## 2022-06-10 DIAGNOSIS — Z11.3 SCREENING EXAMINATION FOR SEXUALLY TRANSMITTED DISEASE: ICD-10-CM

## 2022-06-10 DIAGNOSIS — Z12.4 SCREENING FOR CERVICAL CANCER: ICD-10-CM

## 2022-06-10 PROCEDURE — 87491 CHLMYD TRACH DNA AMP PROBE: CPT

## 2022-06-10 PROCEDURE — 99395 PREV VISIT EST AGE 18-39: CPT

## 2022-06-10 PROCEDURE — 88175 CYTOPATH C/V AUTO FLUID REDO: CPT

## 2022-06-10 PROCEDURE — 87591 N.GONORRHOEAE DNA AMP PROB: CPT

## 2022-06-10 PROCEDURE — 99000 SPECIMEN HANDLING OFFICE-LAB: CPT

## 2022-06-10 ASSESSMENT — FIBROSIS 4 INDEX: FIB4 SCORE: 0.43

## 2022-06-10 NOTE — PROGRESS NOTES
Subjective:     CC:   Chief Complaint   Patient presents with   • Annual Exam   • Gynecologic Exam       HPI:   Viviane Christian is a 26 y.o. female who presents for annual exam. She is feeling well and denies any complaints.    Ob-Gyn/ History:    Patient has GYN provider: no  /Para:    Last Pap Smear:  5 years ago. no history of abnormal pap smears.  Gyn Surgery:  no.  Current Contraceptive Method:  no. no currently sexually active.  Last menstrual period:  .  Periods irregular. moderate bleeding. Cramping is no.   She does not take OTC analgesics for cramps.  No significant bloating/fluid retention, pelvic pain, or dyspareunia. No vaginal discharge  Post-menopausal bleeding: n/a  Urinary incontinence: n/a  Folate intake: discussed and recommended    Health Maintenance  Advanced directive: n/a   Osteoporosis Screen/ DEXA: n/a   PT/vit D for falls prevention: n/a   Cholesterol Screening: n/a   Diabetes Screening: n/a   Aspirin Use: n/a    Diet: eats healthy diet, with vegetables, fruits, and lean protein   Exercise: stays physically active   Substance Abuse: no   Safe in relationship.   Seat belts, bike helmet, gun safety discussed.  Sun protection used.    Cancer screening  Colorectal Cancer Screening: n/a    Lung Cancer Screening: n/a    Cervical Cancer Screening: up to date  Breast Cancer Screening: n/a     Infectious disease screening/Immunizations  --STI Screening: up to date   --Practices safe sex.  --HIV Screening: decline  --Hepatitis C Screening: decline   --Immunizations:    Influenza: n/a    HPV:  Up to date    Tetanus: decline    Shingles: n/a    Pneumococcal : decline       COVID-19: decline  Other immunizations: up to date     She  has a past medical history of Epilepsy (HCC) and Seizure (HCC).  She  has no past surgical history on file.    Family History   Problem Relation Age of Onset   • Cancer Mother         breast   • Diabetes Sister    • Hypertension Sister    •  Colorectal Cancer Neg Hx    • Peritoneal Cancer Neg Hx    • Tubal Cancer Neg Hx    • Ovarian Cancer Neg Hx        Social History     Socioeconomic History   • Marital status: Single     Spouse name: Not on file   • Number of children: Not on file   • Years of education: Not on file   • Highest education level: Associate degree: occupational, technical, or vocational program   Occupational History   • Not on file   Tobacco Use   • Smoking status: Former Smoker     Quit date: 2018     Years since quittin.1   • Smokeless tobacco: Never Used   • Tobacco comment: quit about 5 years ago   Vaping Use   • Vaping Use: Every day   • Substances: Flavoring   Substance and Sexual Activity   • Alcohol use: Yes     Comment: occasional   • Drug use: Never   • Sexual activity: Yes   Other Topics Concern   • Not on file   Social History Narrative   • Not on file     Social Determinants of Health     Financial Resource Strain: Low Risk    • Difficulty of Paying Living Expenses: Not very hard   Food Insecurity: No Food Insecurity   • Worried About Running Out of Food in the Last Year: Never true   • Ran Out of Food in the Last Year: Never true   Transportation Needs: No Transportation Needs   • Lack of Transportation (Medical): No   • Lack of Transportation (Non-Medical): No   Physical Activity: Sufficiently Active   • Days of Exercise per Week: 4 days   • Minutes of Exercise per Session: 80 min   Stress: Stress Concern Present   • Feeling of Stress : Very much   Social Connections: Socially Isolated   • Frequency of Communication with Friends and Family: More than three times a week   • Frequency of Social Gatherings with Friends and Family: Twice a week   • Attends Evangelical Services: Never   • Active Member of Clubs or Organizations: No   • Attends Club or Organization Meetings: Never   • Marital Status: Never    Intimate Partner Violence: Not on file   Housing Stability: Unknown   • Unable to Pay for Housing in the  "Last Year: No   • Number of Places Lived in the Last Year: Not on file   • Unstable Housing in the Last Year: No       Patient Active Problem List    Diagnosis Date Noted   • Neck nodule 05/12/2022   • Epileptic seizure (HCC) 05/11/2022   • Seizure disorder (HCC) 04/29/2022   • Encounter to establish care 04/29/2022   • Lump or mass in breast 04/29/2022   • Skin lesion of breast 04/29/2022         Current Outpatient Medications   Medication Sig Dispense Refill   • levETIRAcetam (KEPPRA) 750 MG tablet Take 1 Tablet by mouth 2 times a day for 60 days. 120 Tablet 0     No current facility-administered medications for this visit.     No Known Allergies    Review of Systems   Constitutional: Negative for fever, chills and malaise/fatigue.   HENT: Negative for congestion.    Eyes: Negative for pain.    Respiratory: Negative for cough and shortness of breath.  Cardiovascular: Negative for leg swelling.   Gastrointestinal: Negative for nausea, vomiting, abdominal pain and diarrhea.   Genitourinary: Negative for dysuria and hematuria.   Skin: Negative for rash.   Neurological: Negative for dizziness, focal weakness and headaches.   Endo/Heme/Allergies: Does not bleed easily.   Psychiatric/Behavioral: Negative for depression.  The patient is not nervous/anxious.      Objective:     /70 (BP Location: Left arm, Patient Position: Sitting, BP Cuff Size: Adult)   Pulse 86   Temp 37 °C (98.6 °F) (Temporal)   Resp 16   Ht 1.651 m (5' 5\") Comment: Pt reported  Wt 56.5 kg (124 lb 9.6 oz)   LMP 05/18/2022 (Exact Date)   SpO2 100%   BMI 20.73 kg/m²   Body mass index is 20.73 kg/m².  Wt Readings from Last 4 Encounters:   06/10/22 56.5 kg (124 lb 9.6 oz)   05/12/22 57.4 kg (126 lb 9.6 oz)   04/29/22 57.2 kg (126 lb)   04/24/22 54.4 kg (120 lb)       Physical Exam:  Constitutional: Well-developed and well-nourished. Not diaphoretic. No distress.   Skin: Skin is warm and dry. No rash noted.  Head: Atraumatic without " lesions.  Eyes: Conjunctivae and extraocular motions are normal. Pupils are equal, round, and reactive to light. No scleral icterus.   Ears:  External ears unremarkable. Tympanic membranes clear and intact.  Nose: Nares patent. Septum midline. Turbinates without erythema nor edema. No discharge.   Mouth/Throat: Dentition is healthy. Tongue normal. Oropharynx is clear and moist. Posterior pharynx without erythema or exudates.  Neck: Supple, trachea midline. Normal range of motion. No thyromegaly present. No lymphadenopathy--cervical or supraclavicular.  Cardiovascular: Regular rate and rhythm, S1 and S2 without murmur, rubs, or gallops.  Lungs: Normal inspiratory effort, CTA bilaterally, no wheezes/rhonchi/rales  Breast: Breasts examined seated and supine. No skin changes, peau d'orange or nipple retraction. No discharge. 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 Active bowel sounds in all four quadrants. No rebound, guarding, masses or HSM.  :Perineum and external genitalia normal without rash. Vagina with normal and physiologic discharge. Cervix without visible lesions or discharge. Bimanual exam without adnexal masses or cervical motion tenderness.  Extremities: No cyanosis, clubbing, erythema, nor edema. Distal pulses intact and symmetric.   Musculoskeletal: All major joints AROM full in all directions without pain.  Neurological: Alert and oriented x 3. DTRs 2+/3 and symmetric. No cranial nerve deficit. 5/5 myotomes. Sensation intact.   Psychiatric:  Behavior, mood, and affect are appropriate.    A chaperone was offered to the patient during today's exam. Chaperone name: deion milian was present.    Assessment and Plan:     1. Screening for cervical cancer  THINPREP PAP ONLY   2. Screening examination for sexually transmitted disease  Chlamydia/GC, PCR (Genital/Anal swab)     1. Screening for cervical  cancer    - THINPREP PAP ONLY; Future    2. Screening examination for sexually transmitted disease    - Chlamydia/GC, PCR (Genital/Anal swab); Future      HCM:  Up to date   Labs per orders  Immunizations per orders  Patient counseled about skin care, diet, supplements, prenatal vitamins, safe sex and exercise.    Follow-up: Return in about 1 year (around 6/10/2023) for annual.    Medical Decision Making/Course:  In the course of preparing for this visit with review of the pertinent past medical history, recent and past clinic visits, current medications, and performing chart, immunization, medical history and medication reconciliation, and in the further course of obtaining the current history pertinent to the clinic visit today, performing an exam and evaluation, ordering and independently evaluating labs, tests, and/or procedures, prescribing any recommended new medications as noted above, providing any pertinent counseling and education and recommending further coordination of care. This was discussed with patient in a shared-decision making conversation, and they understand and agreed with plan of care.  Thank you, aMcarena NEGRETE  [facility]

## 2022-06-10 NOTE — LETTER
Tg 10, 2022         Patient: Viviane Christian   YOB: 1995   Date of Visit: 6/10/2022           To Whom it May Concern:    Viviane Christian was seen in my clinic on 6/10/2022.    If you have any questions or concerns, please don't hesitate to call.        Sincerely,           ABISAI Shen  Electronically Signed

## 2022-06-11 DIAGNOSIS — Z12.4 SCREENING FOR CERVICAL CANCER: ICD-10-CM

## 2022-06-11 LAB
C TRACH DNA GENITAL QL NAA+PROBE: NEGATIVE
N GONORRHOEA DNA GENITAL QL NAA+PROBE: NEGATIVE
SPECIMEN SOURCE: NORMAL

## 2022-06-12 LAB — CYTOLOGY REG CYTOL: NORMAL

## 2022-06-16 ENCOUNTER — HOSPITAL ENCOUNTER (OUTPATIENT)
Dept: RADIOLOGY | Facility: MEDICAL CENTER | Age: 27
End: 2022-06-16
Payer: COMMERCIAL

## 2022-06-16 DIAGNOSIS — N63.0 LUMP OR MASS IN BREAST: ICD-10-CM

## 2022-06-16 LAB
APOB+LDLR+PCSK9 GENE MUT ANL BLD/T: NOT DETECTED
BRCA1+BRCA2 DEL+DUP + FULL MUT ANL BLD/T: NOT DETECTED
MLH1+MSH2+MSH6+PMS2 GN DEL+DUP+FUL M: NOT DETECTED

## 2022-06-16 PROCEDURE — 76642 ULTRASOUND BREAST LIMITED: CPT | Mod: RT

## 2022-06-21 ENCOUNTER — HOSPITAL ENCOUNTER (OUTPATIENT)
Dept: RADIOLOGY | Facility: MEDICAL CENTER | Age: 27
End: 2022-06-21
Payer: COMMERCIAL

## 2022-06-21 DIAGNOSIS — R92.8 ABNORMAL FINDINGS ON DIAGNOSTIC IMAGING OF BREAST: ICD-10-CM

## 2022-06-21 LAB — PATHOLOGY CONSULT NOTE: NORMAL

## 2022-06-21 PROCEDURE — 88305 TISSUE EXAM BY PATHOLOGIST: CPT

## 2022-06-21 PROCEDURE — 19083 BX BREAST 1ST LESION US IMAG: CPT | Mod: RT

## 2022-06-22 ENCOUNTER — TELEPHONE (OUTPATIENT)
Dept: RADIOLOGY | Facility: MEDICAL CENTER | Age: 27
End: 2022-06-22
Payer: COMMERCIAL

## 2022-06-27 ENCOUNTER — OFFICE VISIT (OUTPATIENT)
Dept: URGENT CARE | Facility: CLINIC | Age: 27
End: 2022-06-27
Payer: COMMERCIAL

## 2022-06-27 VITALS
HEIGHT: 65 IN | TEMPERATURE: 98.5 F | BODY MASS INDEX: 20.59 KG/M2 | SYSTOLIC BLOOD PRESSURE: 120 MMHG | RESPIRATION RATE: 14 BRPM | WEIGHT: 123.6 LBS | OXYGEN SATURATION: 98 % | DIASTOLIC BLOOD PRESSURE: 88 MMHG | HEART RATE: 118 BPM

## 2022-06-27 DIAGNOSIS — J32.9 RHINOSINUSITIS: ICD-10-CM

## 2022-06-27 DIAGNOSIS — R05.9 COUGH: ICD-10-CM

## 2022-06-27 PROCEDURE — 99213 OFFICE O/P EST LOW 20 MIN: CPT | Performed by: FAMILY MEDICINE

## 2022-06-27 RX ORDER — AMOXICILLIN AND CLAVULANATE POTASSIUM 875; 125 MG/1; MG/1
1 TABLET, FILM COATED ORAL 2 TIMES DAILY
Qty: 14 TABLET | Refills: 0 | Status: SHIPPED | OUTPATIENT
Start: 2022-06-27 | End: 2022-07-04

## 2022-06-27 RX ORDER — BENZONATATE 200 MG/1
200 CAPSULE ORAL 3 TIMES DAILY PRN
Qty: 30 CAPSULE | Refills: 0 | Status: SHIPPED | OUTPATIENT
Start: 2022-06-27 | End: 2023-01-04

## 2022-06-27 ASSESSMENT — ENCOUNTER SYMPTOMS
NAUSEA: 0
VOMITING: 0
EYE REDNESS: 0
WEIGHT LOSS: 0
MYALGIAS: 0
EYE DISCHARGE: 0

## 2022-06-27 ASSESSMENT — FIBROSIS 4 INDEX: FIB4 SCORE: 0.43

## 2022-06-27 NOTE — PROGRESS NOTES
"Subjective     Viviane Christian is a 26 y.o. female who presents with Cough (Congested x few weeks Recent COVID x month ago. )            2-week sinus pressure and drainage.  No fever.  No PMH sinusitis.  Productive cough without blood in sputum.  No shortness of breath or wheezing.  Symptoms initially started with COVID-19 but have persisted.  No relief with OTC medications.      Review of Systems   Constitutional: Negative for malaise/fatigue and weight loss.   Eyes: Negative for discharge and redness.   Gastrointestinal: Negative for nausea and vomiting.   Musculoskeletal: Negative for joint pain and myalgias.   Skin: Negative for itching and rash.              Objective     /88   Pulse (!) 118   Temp 36.9 °C (98.5 °F)   Resp 14   Ht 1.651 m (5' 5\")   Wt 56.1 kg (123 lb 9.6 oz)   SpO2 98%   BMI 20.57 kg/m²      Physical Exam  Constitutional:       Appearance: Normal appearance. She is not ill-appearing.   HENT:      Head: Normocephalic and atraumatic.      Right Ear: Tympanic membrane normal.      Left Ear: Tympanic membrane normal.      Nose: Congestion present.      Mouth/Throat:      Mouth: Mucous membranes are moist.      Comments: Purulent appearing PND  Cardiovascular:      Rate and Rhythm: Normal rate and regular rhythm.      Heart sounds: Normal heart sounds.   Pulmonary:      Effort: Pulmonary effort is normal.      Breath sounds: Normal breath sounds. No wheezing.   Skin:     General: Skin is warm and dry.      Findings: No rash.   Neurological:      Mental Status: She is alert.                             Assessment & Plan        1. Rhinosinusitis    - amoxicillin-clavulanate (AUGMENTIN) 875-125 MG Tab; Take 1 Tablet by mouth 2 times a day for 7 days.  Dispense: 14 Tablet; Refill: 0    2. Cough    - benzonatate (TESSALON) 200 MG capsule; Take 1 Capsule by mouth 3 times a day as needed for Cough.  Dispense: 30 Capsule; Refill: 0    Differential diagnosis, natural history, supportive " care, and indications for immediate follow-up discussed at length.     Nasal saline, decongestant, nasal corticosteroid

## 2022-06-27 NOTE — LETTER
June 27, 2022         Patient: Viviane Christian   YOB: 1995   Date of Visit: 6/27/2022           To Whom it May Concern:    Viviane Christian was seen in my clinic on 6/27/2022. Please excuse from work today.         Sincerely,           Chung Flores M.D.  Electronically Signed

## 2022-07-07 ENCOUNTER — OFFICE VISIT (OUTPATIENT)
Dept: MEDICAL GROUP | Facility: IMAGING CENTER | Age: 27
End: 2022-07-07
Payer: COMMERCIAL

## 2022-07-07 VITALS
DIASTOLIC BLOOD PRESSURE: 70 MMHG | HEART RATE: 95 BPM | WEIGHT: 121 LBS | HEIGHT: 65 IN | OXYGEN SATURATION: 98 % | SYSTOLIC BLOOD PRESSURE: 110 MMHG | TEMPERATURE: 97.9 F | BODY MASS INDEX: 20.16 KG/M2

## 2022-07-07 DIAGNOSIS — H53.8 BLURRY VISION, LEFT EYE: ICD-10-CM

## 2022-07-07 DIAGNOSIS — H57.12 ACUTE LEFT EYE PAIN: ICD-10-CM

## 2022-07-07 DIAGNOSIS — H53.2 DIPLOPIA: ICD-10-CM

## 2022-07-07 DIAGNOSIS — J32.9 RHINOSINUSITIS: ICD-10-CM

## 2022-07-07 PROCEDURE — 99213 OFFICE O/P EST LOW 20 MIN: CPT

## 2022-07-07 RX ORDER — DOXYCYCLINE HYCLATE 100 MG
100 TABLET ORAL 2 TIMES DAILY
Qty: 14 TABLET | Refills: 0 | Status: SHIPPED | OUTPATIENT
Start: 2022-07-07 | End: 2023-01-04

## 2022-07-07 RX ORDER — METHYLPREDNISOLONE 4 MG/1
TABLET ORAL
Qty: 21 TABLET | Refills: 0 | Status: SHIPPED | OUTPATIENT
Start: 2022-07-07 | End: 2023-01-04

## 2022-07-07 ASSESSMENT — PAIN SCALES - GENERAL: PAINLEVEL: 8=MODERATE-SEVERE PAIN

## 2022-07-07 ASSESSMENT — FIBROSIS 4 INDEX: FIB4 SCORE: 0.43

## 2022-07-07 NOTE — PROGRESS NOTES
Subjective:     CC:   Chief Complaint   Patient presents with   • Eye Problem     Left eye. Sharp pain from nose to eye, started 7 days ago. Blurry vision        HPI:   Viviane presents today to discuss:    Patient was seen in  on 6/27 for rhinosinusitis and was prescribed a 7 day course of Augmentin. Patient admits to stopping antibiotics on day 5 due to developing sharp left eye pain that started on Sunday after hitting her face in the water while wakeboarding. Patient initially had some swelling and redness of her left orbital area which has resolved. However, pain remains persistent and is worse today. She describes it as 8/10 severe throbbing pain that starts on her nose and radiates to left orbital region. Patient reports of having episodes of having left eye blurry vision with intermittent double vision that would last for seconds and will normalize. No alleviating or exacerbating factors identified.  Yesterday, she was told by her co-workers of having a lazy eye on left eye and was told to have it checked.   Patient continues to have left-sided nasal congestion, sinus drainage, and sinus frontal and maxillary pain. Patient endorses to using nasal spray and decongestant medication which provides minimal relief.   No fevers, chills, lethargy, malaise, neck pain or stiffness, altered mentation status, confusion, seizures, loss of coordination, dizziness, facial paresthesia, severe persistent headache, hearing changes or tinnitus.  No left eye redness, swelling, drainage, or black curtaining vision, or abnormal eye movement.        Past Medical History:   Diagnosis Date   • Epilepsy (HCC)    • Seizure (HCC)      Family History   Problem Relation Age of Onset   • Cancer Mother         breast   • Diabetes Sister    • Hypertension Sister    • Colorectal Cancer Neg Hx    • Peritoneal Cancer Neg Hx    • Tubal Cancer Neg Hx    • Ovarian Cancer Neg Hx      No past surgical history on file.  Social History  "    Tobacco Use   • Smoking status: Former Smoker     Quit date: 2018     Years since quittin.1   • Smokeless tobacco: Never Used   • Tobacco comment: quit about 5 years ago   Vaping Use   • Vaping Use: Every day   • Substances: Flavoring   Substance Use Topics   • Alcohol use: Yes     Comment: occasional   • Drug use: Never     Social History     Social History Narrative   • Not on file     Current Outpatient Medications Ordered in Epic   Medication Sig Dispense Refill   • doxycycline (VIBRAMYCIN) 100 MG Tab Take 1 Tablet by mouth 2 times a day. 14 Tablet 0   • methylPREDNISolone (MEDROL DOSEPAK) 4 MG Tablet Therapy Pack As directed on the packaging label. 21 Tablet 0   • levETIRAcetam (KEPPRA PO) Take  by mouth.     • benzonatate (TESSALON) 200 MG capsule Take 1 Capsule by mouth 3 times a day as needed for Cough. 30 Capsule 0     No current Epic-ordered facility-administered medications on file.     Patient has no known allergies.    ROS: see hpi  Gen: no fevers/chills  Pulm: no sob, no cough  CV: no chest pain, no palpitations, no edema  GI: no nausea/vomiting, no diarrhea  Skin: no rash    Objective:   Exam:  /70 (BP Location: Left arm, Patient Position: Sitting, BP Cuff Size: Small adult)   Pulse 95   Temp 36.6 °C (97.9 °F) (Temporal)   Ht 1.651 m (5' 5\")   Wt 54.9 kg (121 lb)   LMP 2022 (Within Days)   SpO2 98%   BMI 20.14 kg/m²    Body mass index is 20.14 kg/m².    Gen: Alert and oriented, No apparent distress.  HEENT: Head atraumatic, normocephalic. Pupils equal and round. No redness, swelling, or drainage noted on left eye. No foreign body noted on left eye.   Neck: Neck is supple without lymphadenopathy.   Lungs: Normal effort, CTA bilaterally, no wheezes, rhonchi, or rales  CV: Regular rate and rhythm. No murmurs, rubs, or gallops.  ABD: +BS. Non-tender, non-distended. No rebound, rigidity, or guarding.  Ext: No clubbing, cyanosis, edema.  Neuro: CN I-XII intact and " unremarkable    Assessment & Plan:     26 y.o. female with the following -     1. Acute left eye pain  2. Blurry vision, left eye  3. Diplopia  Acute with worsening symptoms. Physical examination unremarkable. However, patient's symptoms concerning for possible orbital fracture, cellulitis, or edema. Will order CT to rule out.   ER symptoms discussed such as fevers, chills, lethargy, malaise, neck pain or stiffness, altered mentation status, confusion, seizures, loss of coordination, dizziness, facial paresthesia, severe persistent headache, hearing changes or tinnitus, left eye redness, swelling, drainage, or black curtaining vision, or abnormal eye movement.  Will place referral to Ophthalmology for further evaluation.     - Referral to Ophthalmology  - [id number] WITH & W/O; Future    4. Rhinosinusitis  Ongoing and persistent issue. Patient unable to complete Augmentin regimen. Will treat empirically with doxycycline, side effects discussed and advised patient wear sunscreen while on doxycycline and avoid excessive sun exposure. Encourage to increase fluid intake. Advised patient to start medrol dose pack if no improvement in sinusitis symptoms after 24-48 hours of starting antibiotics. Side effect such as increased hunger and jitteriness discussed with patient. For worsening symptoms, advised to return to clinic. ER symptoms discussed.    - doxycycline (VIBRAMYCIN) 100 MG Tab; Take 1 Tablet by mouth 2 times a day.  Dispense: 14 Tablet; Refill: 0  - methylPREDNISolone (MEDROL DOSEPAK) 4 MG Tablet Therapy Pack; As directed on the packaging label.  Dispense: 21 Tablet; Refill: 0    Medical Decision Making/Course:  In the course of preparing for this visit with review of the pertinent past medical history, recent and past clinic visits, current medications, and performing chart, immunization, medical history and medication reconciliation, and in the further course of obtaining the current history pertinent to  the clinic visit today, performing an exam and evaluation, ordering and independently evaluating labs, tests, and/or procedures, prescribing any recommended new medications as noted above, providing any pertinent counseling and education and recommending further coordination of care. This was discussed with patient in a shared-decision making conversation, and they understand and agreed with plan of care.      Return if symptoms worsen or fail to improve.    ROSEANNE Shen.   [facility]    Please note that this dictation was created using voice recognition software. I have made every reasonable attempt to correct obvious errors, but I expect that there are errors of grammar and possibly content that I did not discover before finalizing the note.

## 2022-07-22 ENCOUNTER — HOSPITAL ENCOUNTER (OUTPATIENT)
Dept: RADIOLOGY | Facility: MEDICAL CENTER | Age: 27
End: 2022-07-22
Payer: COMMERCIAL

## 2022-07-22 DIAGNOSIS — H53.8 BLURRY VISION, LEFT EYE: ICD-10-CM

## 2022-07-22 DIAGNOSIS — H57.12 ACUTE LEFT EYE PAIN: ICD-10-CM

## 2022-07-22 DIAGNOSIS — H53.2 DIPLOPIA: ICD-10-CM

## 2022-07-22 PROCEDURE — 70482 CT ORBIT/EAR/FOSSA W/O&W/DYE: CPT

## 2022-07-22 PROCEDURE — 70481 CT ORBIT/EAR/FOSSA W/DYE: CPT

## 2022-07-22 PROCEDURE — 700117 HCHG RX CONTRAST REV CODE 255

## 2022-07-22 RX ADMIN — IOHEXOL 80 ML: 350 INJECTION, SOLUTION INTRAVENOUS at 15:15

## 2022-11-09 ENCOUNTER — TELEPHONE (OUTPATIENT)
Dept: NEUROLOGY | Facility: MEDICAL CENTER | Age: 27
End: 2022-11-09
Payer: COMMERCIAL

## 2022-11-09 NOTE — TELEPHONE ENCOUNTER
New Patient     [facility] Patient Demographics? Yes    Is visit type and length correct?  Yes    Is referral attached to visit? Yes    Were records received from referring provider? Yes    Patient was not contacted to have someone accompany them to visit?    Is this appointment scheduled as a Hospital Follow-Up?  No    Does the patient require any pre procedure or post procedure follow up? No    If any orders were placed at last visit or intended to be done for this visit do we have Results/Consult Notes? No  Labs - Labs were not ordered at last office visit.  Note: If patient appointment is for lab review and patient did not complete labs, check with provider if OK to reschedule patient until labs completed.  Imaging - Imaging was not ordered at last office visit.  Referrals - No referrals were ordered at last office visit.        10.  If patient appointment is for Botox - is order pended for provider? No

## 2022-11-14 ENCOUNTER — APPOINTMENT (OUTPATIENT)
Dept: NEUROLOGY | Facility: MEDICAL CENTER | Age: 27
End: 2022-11-14
Attending: STUDENT IN AN ORGANIZED HEALTH CARE EDUCATION/TRAINING PROGRAM
Payer: COMMERCIAL

## 2022-12-26 DIAGNOSIS — N64.9 BREAST LESION: ICD-10-CM

## 2022-12-26 DIAGNOSIS — N63.20 BILATERAL BREAST LUMP: ICD-10-CM

## 2022-12-26 DIAGNOSIS — N63.20 MASS OF LEFT BREAST, UNSPECIFIED QUADRANT: ICD-10-CM

## 2022-12-26 DIAGNOSIS — N63.10 MASSES OF BOTH BREASTS: ICD-10-CM

## 2022-12-26 DIAGNOSIS — N63.10 BILATERAL BREAST LUMP: ICD-10-CM

## 2022-12-26 DIAGNOSIS — N63.20 MASSES OF BOTH BREASTS: ICD-10-CM

## 2023-01-03 ENCOUNTER — OFFICE VISIT (OUTPATIENT)
Dept: MEDICAL GROUP | Facility: IMAGING CENTER | Age: 28
End: 2023-01-03
Payer: COMMERCIAL

## 2023-01-03 VITALS
HEIGHT: 65 IN | RESPIRATION RATE: 16 BRPM | SYSTOLIC BLOOD PRESSURE: 112 MMHG | WEIGHT: 124 LBS | OXYGEN SATURATION: 100 % | DIASTOLIC BLOOD PRESSURE: 70 MMHG | BODY MASS INDEX: 20.66 KG/M2 | HEART RATE: 82 BPM | TEMPERATURE: 97.7 F

## 2023-01-03 DIAGNOSIS — R20.2 PARESTHESIA OF LEFT ARM: ICD-10-CM

## 2023-01-03 DIAGNOSIS — G43.119 INTRACTABLE MIGRAINE WITH AURA WITHOUT STATUS MIGRAINOSUS: ICD-10-CM

## 2023-01-03 DIAGNOSIS — R56.9 SEIZURES (HCC): ICD-10-CM

## 2023-01-03 DIAGNOSIS — R41.3 MEMORY LOSS, SHORT TERM: ICD-10-CM

## 2023-01-03 PROCEDURE — 99214 OFFICE O/P EST MOD 30 MIN: CPT

## 2023-01-03 RX ORDER — TOPIRAMATE 25 MG/1
25 TABLET ORAL 2 TIMES DAILY
Qty: 60 TABLET | Refills: 3 | Status: SHIPPED | OUTPATIENT
Start: 2023-01-03 | End: 2023-01-09

## 2023-01-03 ASSESSMENT — FIBROSIS 4 INDEX: FIB4 SCORE: 0.45

## 2023-01-03 ASSESSMENT — PATIENT HEALTH QUESTIONNAIRE - PHQ9: CLINICAL INTERPRETATION OF PHQ2 SCORE: 2

## 2023-01-03 NOTE — PROGRESS NOTES
"Subjective:     CC:   Chief Complaint   Patient presents with    Other     Pt report having epilepsy and head feeling heavy in the nape area, experiencing shuttering and trouble remembering things. Stop taking medication levetricetam because makes her sleepy   About a month       HPI:   Viviane presents today to discuss:    Patient with seizure disorder since age 17. She admits to non adherence to Keppra at times. Patient has not established with Neurology yet. She reports that they have rescheduled her appointment several times. She has an appointment on 1/30 to see neurology.   Patient reports developing symptoms of intermittent short-term memory lapse, stuttering at times, and left sided paresthesia starting this month. These episodes are occurring more frequently. No alleviating or exacerbating factors identified.  She reports that she would be driving at time and will have no recollection of how she got the the location or would forget what she was talking to someone about minutes after the conversation has ended. She denies having any seizure episodes but reports of being told that she would \"zone out\" at work.  She admits that she has stopped taking her Keppra ~ a month ago due to side effects of drowsiness and grogginess. She was taking 750 mg BID. She has tried to decrease dose in the past to 750 mg daily without much relief. She continued to have drowsiness and grogginess.   She was hospitalized on 4/24 for seizures due her stopping Keppra.     Patient also reports developing worsening migraines that are increasing in frequency. She is currently having 3 migraine episodes a week. She reports developing new symptom of feeling of constant \"heaviness\" at her occiput area. She continues to have aura of photosensitivity, nausea, and scotoma. She has had to take high dose Tylenol with her old rx of hydrocodone to help with her migraines.    She denies difficulty speaking, loss of balance and coordination, " "altered mental status, thunder clap headache, weakness of any extremities, dizziness, chest pain,  syncope, or incontinence.        Past Medical History:   Diagnosis Date    Epilepsy (HCC)     Seizure (HCC)      Family History   Problem Relation Age of Onset    Cancer Mother         breast    Diabetes Sister     Hypertension Sister     Colorectal Cancer Neg Hx     Peritoneal Cancer Neg Hx     Tubal Cancer Neg Hx     Ovarian Cancer Neg Hx      History reviewed. No pertinent surgical history.  Social History     Tobacco Use    Smoking status: Former     Types: Cigarettes     Quit date: 2018     Years since quittin.6    Smokeless tobacco: Never    Tobacco comments:     quit about 5 years ago   Vaping Use    Vaping Use: Every day    Substances: Flavoring   Substance Use Topics    Alcohol use: Yes     Comment: occasional    Drug use: Never     Social History     Social History Narrative    Not on file     Current Outpatient Medications Ordered in Epic   Medication Sig Dispense Refill    topiramate (TOPAMAX) 25 MG Tab Take 1 Tablet by mouth 2 times a day. 60 Tablet 3    levETIRAcetam (KEPPRA PO) Take  by mouth. (Patient not taking: Reported on 1/3/2023)       No current [facility]-ordered facility-administered medications on file.     Patient has no known allergies.    ROS: see hpi  Gen: no fevers/chills  Pulm: no sob, no cough  CV: no chest pain, no palpitations, no edema  GI: no nausea/vomiting, no diarrhea  Skin: no rash    Objective:   Exam:  /70 (BP Location: Left arm, Patient Position: Sitting, BP Cuff Size: Adult)   Pulse 82   Temp 36.5 °C (97.7 °F) (Temporal)   Resp 16   Ht 1.651 m (5' 5\")   Wt 56.2 kg (124 lb)   LMP 2022   SpO2 100%   BMI 20.63 kg/m²    Body mass index is 20.63 kg/m².    Gen: Alert and oriented, No apparent distress.  HEENT: Head atraumatic, normocephalic. Pupils equal and round.   Neuro: Mental Status: Speech fluent without errors. Follows all commands. No dysarthria or " apraxia.  Cranial Nerves: Pupils equal round and reactive to light. Extraocular motion intact. Visual fields intact. No nystagmus. CN V1-V3 intact to light touch. No facial asymmetry. Hearing clinically intact bilaterally. Tongue protrusion midline. No uvular deviation. Normal shoulder shrug and head turn.  Motor:  RUE: 5/5 with hand , 5/5 with flexion at the elbow 5/5 with extension at the elbow  LUE: 5/5 with hand , 5/5 with flexion at the elbow 5/5 with extension at the elbow  RLE: 5/5 with leg raise, 5/5 with plantar flexion, 5/5 with dorsal flexion  LLE: 5/5 with leg raise, 5/5 with plantar flexion, 5/5 with dorsal flexion  Sensation to light touch intact throughout  No ataxia noted  Rapidly alternating movements without difficulty  Neck: Neck is supple without lymphadenopathy.   Lungs: Normal effort, CTA bilaterally, no wheezes, rhonchi, or rales  CV: Regular rate and rhythm. No murmurs, rubs, or gallops.  ABD: +BS. Non-tender, non-distended. No rebound, rigidity, or guarding.  Ext: No clubbing, cyanosis, edema.    Assessment & Plan:     27 y.o. female with the following -     1. Seizures (HCC)  2. Memory loss, short term  3. Paresthesia of left arm  Chronic and uncontrolled condition on Keppra. Patient admits to non adherence to Keppra due to side effects of grogginess and drowsiness.  Patient denies seizure episodes; however, she is developed new symptoms of memory lapse and left-sided paresthesia. Likely due to her uncontrolled seizures vs migraine. Her Neuro exam is unremarkable. Low suspicion for CVA. However, due to her developing new symptoms, will order MRI of brain to rule out pathological cause. Patient agreeable to this.  Patient does not want to restart Keppra due to side effects.  Discussed starting topiramate to treat seizure and migraine, side effects discussed. Patient agreeable to this.  Advised patient to f/u with neurology as scheduled.  Discussed in details of alarm symptoms and for  patient to go to the Emergency room if experiencing the following:   -confusion, amnesia, somnolence, mood or cognitive changes, sleep disturbances, severe headache, dizziness, loss of balance, delayed speech, memory deficits, seizures, syncope, vision changes, nausea, vomiting, weakness, numbness to any extremities, urinary or bowel incontinence, severe pain, chest pain, SOB, difficulty breathing.     - topiramate (TOPAMAX) 25 MG Tab; Take 1 Tablet by mouth 2 times a day.  Dispense: 60 Tablet; Refill: 3  - MR-BRAIN-W/O; Future  - Referral to Neurology    4. Intractable migraine with aura without status migrainosus  Chronic and uncontrolled condition. Patient currently takes Tylenol and old rx of hydrocodone for symptom relief. Neuro exam unremarkable. Patient reports developing new symptoms mentioned above, will order MRI of brain for evaluation. Discussed therapeutic lifestyle measures for controlling migraines such as good sleep hygiene, routine meal schedules, regular exercise, healthy diet, stress reduction techniques such as yoga, meditation or acupuncture, and managing migraine triggers.   Recommend for patient to take OTC supplementation for maintenance:  Magnesium Oxide 400 mg tablet daily  Riboflavin 400 mg capsule daily  CoQ10 100 mg twice a day  Or MIGRAVENT which has the above ingredients in one supplement daily.    For abortive measures: Avoid use of abortive agents more than twice per week as it may result in REBOUND HEADACHES.   May continue to use OTC medication such as Excedrin, Acetaminophen (Tylenol), or NSAID (advil/naproxen) as needed only.  Will trial patient on Topiramate for migraines and it's potential benefit for weight loss, patient agreeable to this. Advised patient to start 25 mg PO for 1 week and may increase to 50 mg thereafter and follow up with me in a 4 weeks to re-assess. Discussed side such as nausea, diarrhea, fatigue, and taste perversion. Advised patient to go to ER for  symptoms such as dizziness, numbness/tingling or weakness on any extremities, loss of balance, memory deficits, mood disorders, shortness of breath, chest pain, palpitations, or syncope.  Patient given handout information on Topiramate.     - topiramate (TOPAMAX) 25 MG Tab; Take 1 Tablet by mouth 2 times a day.  Dispense: 60 Tablet; Refill: 3  - MR-BRAIN-W/O; Future  - Referral to Neurology    Medical Decision Making/Course:  In the course of preparing for this visit with review of the pertinent past medical history, recent and past clinic visits, current medications, and performing chart, immunization, medical history and medication reconciliation, and in the further course of obtaining the current history pertinent to the clinic visit today, performing an exam and evaluation, ordering and independently evaluating labs, tests, and/or procedures, prescribing any recommended new medications as noted above, providing any pertinent counseling and education and recommending further coordination of care. This was discussed with patient in a shared-decision making conversation, and they understand and agreed with plan of care.     Return if symptoms worsen or fail to improve.    ABISAI Shen   [facility]    Please note that this dictation was created using voice recognition software. I have made every reasonable attempt to correct obvious errors, but I expect that there are errors of grammar and possibly content that I did not discover before finalizing the note.

## 2023-01-06 ENCOUNTER — TELEPHONE (OUTPATIENT)
Dept: MEDICAL GROUP | Facility: IMAGING CENTER | Age: 28
End: 2023-01-06
Payer: COMMERCIAL

## 2023-01-06 NOTE — TELEPHONE ENCOUNTER
Caller Name: Viviane Christian    Call Back Number: [phone] (home)       How would the patient prefer to be contacted with a response: Phone call do NOT leave a detailed message    Received a call from the pt and she stated the neurology office keeps rescheduling her and now they pushed her appointment out to April. She would like to see if their is another location that can take her in. She also said that she collapsed yesterday.       Macarena can you send another referral in for neurology to see if we can get her in to a different location or order the referral as STAT. Please advise.     Thank you

## 2023-01-09 ENCOUNTER — HOSPITAL ENCOUNTER (OUTPATIENT)
Facility: MEDICAL CENTER | Age: 28
End: 2023-01-09
Payer: COMMERCIAL

## 2023-01-09 ENCOUNTER — OFFICE VISIT (OUTPATIENT)
Dept: MEDICAL GROUP | Facility: IMAGING CENTER | Age: 28
End: 2023-01-09
Payer: COMMERCIAL

## 2023-01-09 ENCOUNTER — HOSPITAL ENCOUNTER (OUTPATIENT)
Dept: RADIOLOGY | Facility: MEDICAL CENTER | Age: 28
End: 2023-01-09
Payer: COMMERCIAL

## 2023-01-09 VITALS
TEMPERATURE: 97.7 F | DIASTOLIC BLOOD PRESSURE: 72 MMHG | BODY MASS INDEX: 20.39 KG/M2 | RESPIRATION RATE: 16 BRPM | HEIGHT: 65 IN | HEART RATE: 104 BPM | WEIGHT: 122.4 LBS | SYSTOLIC BLOOD PRESSURE: 114 MMHG | OXYGEN SATURATION: 99 %

## 2023-01-09 DIAGNOSIS — R41.3 MEMORY LOSS, SHORT TERM: ICD-10-CM

## 2023-01-09 DIAGNOSIS — R47.89 SPEECH DYSFLUENCY: ICD-10-CM

## 2023-01-09 DIAGNOSIS — R56.9 SEIZURE (HCC): ICD-10-CM

## 2023-01-09 DIAGNOSIS — Z79.899 CONTROLLED SUBSTANCE AGREEMENT SIGNED: ICD-10-CM

## 2023-01-09 PROCEDURE — 80307 DRUG TEST PRSMV CHEM ANLYZR: CPT

## 2023-01-09 PROCEDURE — 99214 OFFICE O/P EST MOD 30 MIN: CPT

## 2023-01-09 PROCEDURE — 70551 MRI BRAIN STEM W/O DYE: CPT

## 2023-01-09 RX ORDER — LORAZEPAM 0.5 MG/1
0.5 TABLET ORAL 2 TIMES DAILY PRN
Qty: 28 TABLET | Refills: 0 | Status: SHIPPED | OUTPATIENT
Start: 2023-01-09 | End: 2023-01-23

## 2023-01-09 RX ORDER — LEVETIRACETAM 500 MG/1
500 TABLET ORAL 2 TIMES DAILY
Qty: 60 TABLET | Refills: 1 | Status: SHIPPED | OUTPATIENT
Start: 2023-01-09 | End: 2023-01-31

## 2023-01-09 ASSESSMENT — ENCOUNTER SYMPTOMS
NERVOUS/ANXIOUS: 0
MUSCULOSKELETAL NEGATIVE: 1
HALLUCINATIONS: 0
DEPRESSION: 0
DIAPHORESIS: 0
SPEECH CHANGE: 1
FEVER: 0
TINGLING: 1
LOSS OF CONSCIOUSNESS: 0
SEIZURES: 1
DIZZINESS: 0
FOCAL WEAKNESS: 1
HEADACHES: 1
MEMORY LOSS: 1
GASTROINTESTINAL NEGATIVE: 1
CHILLS: 0
INSOMNIA: 0
TREMORS: 1
WEAKNESS: 1
SENSORY CHANGE: 0

## 2023-01-09 ASSESSMENT — LIFESTYLE VARIABLES: SUBSTANCE_ABUSE: 0

## 2023-01-09 ASSESSMENT — FIBROSIS 4 INDEX: FIB4 SCORE: 0.45

## 2023-01-09 NOTE — LETTER
[facility]  [place] [facility] [address]  [address] [place] [address]     January 13, 2023    Patient: Viviane Christian   YOB: 1995   Date of Visit: 1/9/2023       To Whom It May Concern:    Viviane Christian was seen and treated in our department on 1/9/2023. Please excuse Viviane from work until she is seen by Neurologist on 1/25. Her current condition of epilepsy remains uncontrolled which severely impacts her ability to perform activities of daily living as well as her ability to perform her job duties. For her safety and those around her, it is advisable that she be excused from work until her symptoms are well controlled. IF you have any question, please call me at [phone].    Sincerely,   ABISAI Shen  Electronically signed 1/13/2023  [address] [place] [address]  [phone]

## 2023-01-09 NOTE — PROGRESS NOTES
Subjective:     Chief Complaint   Patient presents with    Other     Pt reported Thursday collapsed at Work and EMT was called     HPI:   Viviane, accompanied by her significant other Niurka presents today to discuss:      Seizures   Memory loss, short term  Speech delay  Established and worsening condition. Patient reports of having 2 seizure episodes since last office visit on 1/3. She started topiramax without relief. She states that her first seizure episode occured after taking topiramax. She has been off this medication.  She reports of having witnessed seizure episode while at work on Thursday. Per her co-workers, patient had a grand mal seizure where she loss consciousness for a few minutes. EMS called; however, patient refused to go to ED at the time.   Since the episode, patient continues to have memory loss and her speech delay is worsening. She is having difficulty vocalizing words out and it takes her a while to communicate. She continues to have intermittent left sided paresthesia. Her migraine episodes remain persistent.   Patient was scheduled to see Neurology on ; however, her appointment was rescheduled again to . She reports that the neurology office has rescheduled her appointment several times.   She is scheduled for an MRI on .      Past Medical History:   Diagnosis Date    Epilepsy (HCC)     Seizure (HCC)      Family History   Problem Relation Age of Onset    Cancer Mother         breast    Diabetes Sister     Hypertension Sister     Colorectal Cancer Neg Hx     Peritoneal Cancer Neg Hx     Tubal Cancer Neg Hx     Ovarian Cancer Neg Hx      History reviewed. No pertinent surgical history.  Social History     Tobacco Use    Smoking status: Former     Types: Cigarettes     Quit date: 2018     Years since quittin.7    Smokeless tobacco: Never    Tobacco comments:     quit about 5 years ago   Vaping Use    Vaping Use: Every day    Substances: Flavoring   Substance Use Topics  "   Alcohol use: Yes     Comment: occasional    Drug use: Never     Social History     Social History Narrative    Not on file     Current Outpatient Medications Ordered in Epic   Medication Sig Dispense Refill    levETIRAcetam (KEPPRA) 500 MG Tab Take 1 Tablet by mouth 2 times a day for 30 days. 60 Tablet 1    LORazepam (ATIVAN) 0.5 MG Tab Take 1 Tablet by mouth 2 times a day as needed for Anxiety for up to 14 days. 28 Tablet 0    levETIRAcetam (KEPPRA PO) Take  by mouth. (Patient not taking: Reported on 1/3/2023)       No current [facility]-ordered facility-administered medications on file.     Patient has no known allergies.  Review of Systems   Constitutional:  Positive for malaise/fatigue. Negative for chills, diaphoresis and fever.   HENT: Negative.     Gastrointestinal: Negative.    Genitourinary: Negative.    Musculoskeletal: Negative.    Skin: Negative.    Neurological:  Positive for tingling, tremors, speech change, focal weakness, seizures, weakness and headaches. Negative for dizziness, sensory change and loss of consciousness.   Endo/Heme/Allergies: Negative.    Psychiatric/Behavioral:  Positive for memory loss. Negative for depression, hallucinations, substance abuse and suicidal ideas. The patient is not nervous/anxious and does not have insomnia.        Objective:   Exam:  /72 (BP Location: Left arm, Patient Position: Sitting, BP Cuff Size: Adult)   Pulse (!) 104   Temp 36.5 °C (97.7 °F) (Temporal)   Resp 16   Ht 1.651 m (5' 5\") Comment: visited 1/3  Wt 55.5 kg (122 lb 6.4 oz)   LMP 12/23/2022   SpO2 99%   BMI 20.37 kg/m²    Body mass index is 20.37 kg/m².    Gen: Alert and oriented, No apparent distress.  HEENT: Head atraumatic, normocephalic. Pupils equal and round.  Neck: Neck is supple without lymphadenopathy.   Lungs: Normal effort, CTA bilaterally, no wheezes, rhonchi, or rales  CV: Regular rate and rhythm. No murmurs, rubs, or gallops.  ABD: +BS. Non-tender, non-distended. No rebound, " rigidity, or guarding.  Ext: No clubbing, cyanosis, edema.  Physical Exam  Constitutional:       General: She is not in acute distress.     Appearance: Normal appearance. She is not ill-appearing, toxic-appearing or diaphoretic.   HENT:      Head: Normocephalic and atraumatic.      Nose: Nose normal.   Eyes:      Extraocular Movements: Extraocular movements intact.      Conjunctiva/sclera: Conjunctivae normal.      Pupils: Pupils are equal, round, and reactive to light.   Cardiovascular:      Rate and Rhythm: Normal rate and regular rhythm.      Pulses: Normal pulses.      Heart sounds: Normal heart sounds.   Pulmonary:      Effort: Pulmonary effort is normal.      Breath sounds: Normal breath sounds.   Abdominal:      General: Abdomen is flat. Bowel sounds are normal.      Palpations: Abdomen is soft.   Musculoskeletal:         General: Normal range of motion.      Cervical back: Normal range of motion and neck supple.   Skin:     General: Skin is warm and dry.      Capillary Refill: Capillary refill takes less than 2 seconds.   Neurological:      Mental Status: She is alert and oriented to person, place, and time.      Cranial Nerves: No cranial nerve deficit.      Sensory: No sensory deficit.      Motor: No weakness.      Coordination: Coordination normal.      Gait: Gait normal.      Deep Tendon Reflexes: Reflexes normal.   Psychiatric:         Mood and Affect: Mood normal.   Dysfluency in speech noted    Assessment & Plan:     27 y.o. female with the following -     1. Seizure (HCC)  2. Memory loss, short term  3. Speech dysfluency  4. Chronic use of benzodiazepine for therapeutic purpose  Established and worsening condition.  Patient s/p witnessed grand mal seizure on Thursday. She declined ED visit at the time. Her symptoms did not improve with Topiramate. Advised to stop. Will restart back on Keppra. At this point, priority in controlling her seizures takes precedence than side effect of grogginess while at  work which is the reason for her noncompliance with the medication.   Will treat with Ativan PRN for gran mal seizures. Med administration and side effects discussed. DO NOT TAKE WITH ALCOHOL.  Obtained and reviewed patient utilization report from [facility] pharmacy database on 1/9/2023 2:38 PM  prior to writing prescription for controlled substance II, III or IV per [place] bill . Based on assessment of the report, the prescription is medically necessary.   Controlled substance discussed with client. Client agrees to abide by controlled substance contract.  I have called Neurology myself to try and get patient in sooner; however, the earliest they can get her in is April. Patient and significant other have called multiple neurologist in [facility] and in [place] to get her in sooner.   Will place another urgent referral to neurologist in [place].   Will order labs to rule out pathological cause contributing to her episodes.  Will order stat MRI and EEG due to her recent grand mal seizure and worsening new symptoms of speech dysfluency, paresthesia, and short term memory impairment.   Patient and significant other given strict instruction to go to ED for seizures or worsening symptoms.     - Referral to Neurodiagnostics (EEG,EP,EMG/NCS/DBS)  - levETIRAcetam (KEPPRA) 500 MG Tab; Take 1 Tablet by mouth 2 times a day for 30 days.  Dispense: 60 Tablet; Refill: 1  - CBC WITH DIFFERENTIAL; Future  - Comp Metabolic Panel; Future  - FOLATE; Future  - LACTIC ACID; Future  - TSH WITH REFLEX TO FT4; Future  - VITAMIN B12; Future  - Referral to Neurology  - MR-BRAIN-W/O; Future  - LORazepam (ATIVAN) 0.5 MG Tab; Take 1 Tablet by mouth 2 times a day as needed for Anxiety for up to 14 days.  Dispense: 28 Tablet; Refill: 0    Medical Decision Making/Course:  In the course of preparing for this visit with review of the pertinent past medical history, recent and past clinic visits, current medications, and performing chart, immunization,  medical history and medication reconciliation, and in the further course of obtaining the current history pertinent to the clinic visit today, performing an exam and evaluation, ordering and independently evaluating labs, tests, and/or procedures, prescribing any recommended new medications as noted above, providing any pertinent counseling and education and recommending further coordination of care. This was discussed with patient in a shared-decision making conversation, and they understand and agreed with plan of care.     Return in about 4 weeks (around 2/6/2023), or if symptoms worsen or fail to improve, for med check.    ABISAI Shen   [facility]    Please note that this dictation was created using voice recognition software. I have made every reasonable attempt to correct obvious errors, but I expect that there are errors of grammar and possibly content that I did not discover before finalizing the note.

## 2023-01-09 NOTE — LETTER
[facility]  [place] [id number] [place] [address]     January 9, 2023    Patient: Viviane Christian   YOB: 1995   Date of Visit: 1/9/2023       To Whom It May Concern:    Viviane Christian was seen and treated in our department on 1/9/2023. Please excuse Viviane from work starting 1/9/2023 to   1/13/2023. She may return 1/16 or depending on her condition. Her current illness and condition requires close monitoring and plenty of rest for her safety and recovery. If you have any questions, please call me at [phone].    Sincerely,     ROSEANNE Shen.

## 2023-01-10 ENCOUNTER — NON-PROVIDER VISIT (OUTPATIENT)
Dept: NEUROLOGY | Facility: MEDICAL CENTER | Age: 28
End: 2023-01-10
Attending: PSYCHIATRY & NEUROLOGY
Payer: COMMERCIAL

## 2023-01-10 DIAGNOSIS — R41.3 MEMORY LOSS: ICD-10-CM

## 2023-01-10 DIAGNOSIS — R47.89 DYSFLUENCY: ICD-10-CM

## 2023-01-10 DIAGNOSIS — R56.9 SEIZURE (HCC): ICD-10-CM

## 2023-01-10 PROCEDURE — 95816 EEG AWAKE AND DROWSY: CPT | Performed by: PSYCHIATRY & NEUROLOGY

## 2023-01-10 PROCEDURE — 95816 EEG AWAKE AND DROWSY: CPT | Mod: 26 | Performed by: PSYCHIATRY & NEUROLOGY

## 2023-01-10 NOTE — PROCEDURES
VIDEO ELECTROENCEPHALOGRAM REPORT      Referring provider: Kalen NEGRETE     DOS: 01/10/23 (total recording of 33 minutes).     INDICATION:  Viviane Christian 27 y.o. female presenting with seizure and speech difficulty     CURRENT ANTIEPILEPTIC REGIMEN: bibe     TECHNIQUE: 30 channel video electroencephalogram (EEG) was performed in accordance with the international 10-20 system. The study was reviewed in bipolar and referential montages. The recording examined the patient during   awake and drowsy states    DESCRIPTION OF THE RECORD:  During the wakefulness, the background showed a symmetrical 11 Hz alpha activity posteriorly with amplitude of 70 mV.  There was reactivity to eye closure/opening.  A normal anterior-posterior gradient was noted with faster beta frequencies seen anteriorly.  During drowsiness, increased theta/beta frequencies were seen.  No sleep attained.     ACTIVATION PROCEDURES:     Hyperventilation was performed by the patient for a total of 3 minutes. The technician performing the test noted good effort. No physiological build up seen.     Intermittent Photic stimulation was performed in a stepwise fashion from 1 to 30 Hz and elicited no photic driving response.     ICTAL AND/OR INTERICTAL FINDINGS:   No focal or generalized epileptiform activity noted. No regional slowing was seen during this routine study.  No clinical events or seizures were reported or recorded during the study.     EKG: sampling of the EKG recording demonstrated sinus rhythm.     EVENTS: none     INTERPRETATION:    This is a normal video EEG recording in the awake and drowsy states.   Note: A normal EEG does not rule out epilepsy.  If the clinical suspicion remains high for seizures, a prolonged recording to capture clinical or subclinical events may be helpful.    Raj Yee MD  Diplomate in Neurology&Epilepsy  Office: [phone]  Fax: [phone]

## 2023-01-11 LAB
AMPHETAMINES UR QL: NEGATIVE NG/ML
BARBITURATES UR QL: NEGATIVE NG/ML
BENZODIAZ UR QL: NEGATIVE NG/ML
CANNABINOIDS UR QL SCN: NEGATIVE NG/ML
COCAINE UR QL: NEGATIVE NG/ML
DRUG SCREEN COMMENT UR-IMP: NORMAL
MDMA CTO UR SCN-MCNC: NEGATIVE NG/ML
METHADONE UR QL: NEGATIVE NG/ML
OPIATES UR QL: NEGATIVE NG/ML
OXYCODONE CTO UR SCN-MCNC: NEGATIVE NG/ML
PCP UR QL SCN: NEGATIVE NG/ML
PROPOXYPH UR QL: NEGATIVE NG/ML

## 2023-01-30 ENCOUNTER — OFFICE VISIT (OUTPATIENT)
Dept: MEDICAL GROUP | Facility: IMAGING CENTER | Age: 28
End: 2023-01-30
Payer: COMMERCIAL

## 2023-01-30 VITALS
HEART RATE: 97 BPM | TEMPERATURE: 97 F | WEIGHT: 117.6 LBS | DIASTOLIC BLOOD PRESSURE: 68 MMHG | RESPIRATION RATE: 16 BRPM | HEIGHT: 65 IN | SYSTOLIC BLOOD PRESSURE: 98 MMHG | OXYGEN SATURATION: 99 % | BODY MASS INDEX: 19.59 KG/M2

## 2023-01-30 DIAGNOSIS — G40.409 GRAND MAL SEIZURE (HCC): ICD-10-CM

## 2023-01-30 DIAGNOSIS — Z02.89 ENCOUNTER FOR COMPLETION OF FORM WITH PATIENT: ICD-10-CM

## 2023-01-30 DIAGNOSIS — F33.1 MODERATE EPISODE OF RECURRENT MAJOR DEPRESSIVE DISORDER (HCC): ICD-10-CM

## 2023-01-30 PROCEDURE — 99212 OFFICE O/P EST SF 10 MIN: CPT

## 2023-01-30 ASSESSMENT — FIBROSIS 4 INDEX: FIB4 SCORE: 0.45

## 2023-01-30 NOTE — PROGRESS NOTES
Subjective:     CC:   Chief Complaint   Patient presents with    Medical Clearance     Pt reported forms to fill out for back to work       HPI:   Viviane presents today to discuss:    Grand mal seizures  Patient was seen by neurologist in [place] on . Her 's liscensce were revoked r/t to her uncontrolled symptoms for her safety and safety of others.    She is scheduled f/u with neurologist in one month. Patient was started on new anti-seizure Xcopri. However, she reports that she has not started this medication. She will be picking the rx up today. She is currently taking her Keppra.   She reports that she has had one seizure episodes since seeing her neurologist.   She presents today to request ADA paperwork. She has missed several weeks of work due to her uncontrolled condition.   Her neurologist recommended that she may return to work a few weeks after starting new medication and her symptoms are controlled and no longer having episodes.   -I have requested Neurology medical records.  If symptoms worsen, advised to go to ED.    Moderate episode of recurrent major depressive disorder  Patient admits to having depression r/t to her uncontrolled seizures.  Her neurologist had recommended that she be seen by psychiatrist.  Patient is requesting referral to psychiatry.  Denies intent to harm self of others. NO recent mental health hospitalizations.      Family History   Problem Relation Age of Onset    Cancer Mother         breast    Diabetes Sister     Hypertension Sister     Colorectal Cancer Neg Hx     Peritoneal Cancer Neg Hx     Tubal Cancer Neg Hx     Ovarian Cancer Neg Hx      History reviewed. No pertinent surgical history.  Social History     Tobacco Use    Smoking status: Former     Types: Cigarettes     Quit date: 2018     Years since quittin.7    Smokeless tobacco: Never    Tobacco comments:     quit about 5 years ago   Vaping Use    Vaping Use: Every day    Substances: Flavoring  "  Substance Use Topics    Alcohol use: Yes     Comment: occasional    Drug use: Never     Social History     Social History Narrative    Not on file     Current Outpatient Medications Ordered in Epic   Medication Sig Dispense Refill    cenobamate (XCOPRI) 50 MG Tab tablet Take 50 mg by mouth every day.      levETIRAcetam (KEPPRA) 500 MG Tab Take 1 Tablet by mouth 2 times a day for 30 days. 60 Tablet 1     No current Epic-ordered facility-administered medications on file.     Patient has no known allergies.    ROS: see hpi  Gen: no fevers/chills  Pulm: no sob, no cough  CV: no chest pain, no palpitations, no edema  GI: no nausea/vomiting, no diarrhea  Skin: no rash    Objective:   Exam:  BP 98/68 (BP Location: Left arm, Patient Position: Sitting, BP Cuff Size: Adult)   Pulse 97   Temp 36.1 °C (97 °F) (Temporal)   Resp 16   Ht 1.651 m (5' 5\") Comment: pt reported  Wt 53.3 kg (117 lb 9.6 oz)   LMP 01/30/2023   SpO2 99%   BMI 19.57 kg/m²    Body mass index is 19.57 kg/m².    Gen: Alert and oriented, No apparent distress.  HEENT: Head atraumatic, normocephalic. Pupils equal and round.  Neck: Neck is supple without lymphadenopathy.   Lungs: Normal effort, CTA bilaterally, no wheezes, rhonchi, or rales  CV: Regular rate and rhythm. No murmurs, rubs, or gallops.  ABD: +BS. Non-tender, non-distended. No rebound, rigidity, or guarding.  Ext: No clubbing, cyanosis, edema.    Assessment & Plan:     27 y.o. female with the following -     1. Grand mal seizure (HCC)  2. Encounter for completion of form with patient  Chronic and uncontrolled condition.  Patient is managed by neurology.  Recommend to follow-up with neurology as directed.  She is currently taking Keppra but has had 1 seizure episode since seeing neurologist.  She has not started Xcopri.  Instructed patient to start this medication as soon as possible.  ADA paperwork completed and returned to patient for submission.  Patient is estimated to be able to return " to work on 2/13 given that she does not have any seizure episodes. She is unable to drive until cleared by Neurology.     3. Moderate episode of recurrent major depressive disorder (HCC)  New and uncontrolled condition.  Patient presentation related to uncontrolled grand mal seizures and loosing her independence by having her DL revoked. However, she is to start a new medication to control her seizures which she is optimistic on.  Patient denies suicidal/homicidal ideation or thoughts of hopelessness. Will place referral to psychiatry.    - Referral to Psychiatry    Medical Decision Making/Course:  In the course of preparing for this visit with review of the pertinent past medical history, recent and past clinic visits, current medications, and performing chart, immunization, medical history and medication reconciliation, and in the further course of obtaining the current history pertinent to the clinic visit today, performing an exam and evaluation, ordering and independently evaluating labs, tests, and/or procedures, prescribing any recommended new medications as noted above, providing any pertinent counseling and education and recommending further coordination of care. This was discussed with patient in a shared-decision making conversation, and they understand and agreed with plan of care.     Return if symptoms worsen or fail to improve.    ROSEANNE Shen.   [facility]    Please note that this dictation was created using voice recognition software. I have made every reasonable attempt to correct obvious errors, but I expect that there are errors of grammar and possibly content that I did not discover before finalizing the note.

## 2023-01-30 NOTE — LETTER
[facility]  SANDY ShenP.R.N.  [address]   [place] [address]  Fax: [phone]   Authorization for Release/Disclosure of   Protected Health Information   Name: VIVIANE HOLGUIN : 1995 SSN: [id number]   Address: [address] [place] [address]  [address]  [place] [address] Phone:    [phone] (home)    I authorize the entity listed below to release/disclose the PHI below to:   [facility]/Macarena Higuera A.P.R.N. and ROCIO Shen.P.R.N.   Provider or Entity Name:     Address   City, State, Zip   Phone:      Fax:     Reason for request: continuity of care   Information to be released:    [  ] LAST COLONOSCOPY,  including any PATH REPORT and follow-up  [  ] LAST FIT/COLOGUARD RESULT [  ] LAST DEXA  [  ] LAST MAMMOGRAM  [  ] LAST PAP  [  ] LAST LABS [  ] RETINA EXAM REPORT  [  ] IMMUNIZATION RECORDS  [  ] Release all info      [  ] Check here and initial the line next to each item to release ALL health information INCLUDING  _____ Care and treatment for drug and / or alcohol abuse  _____ HIV testing, infection status, or AIDS  _____ Genetic Testing    DATES OF SERVICE OR TIME PERIOD TO BE DISCLOSED: _____________  I understand and acknowledge that:  * This Authorization may be revoked at any time by you in writing, except if your health information has already been used or disclosed.  * Your health information that will be used or disclosed as a result of you signing this authorization could be re-disclosed by the recipient. If this occurs, your re-disclosed health information may no longer be protected by State or Federal laws.  * You may refuse to sign this Authorization. Your refusal will not affect your ability to obtain treatment.  * This Authorization becomes effective upon signing and will  on (date) __________.      If no date is indicated, this Authorization will  one (1) year from the signature date.    Name: Viviane Holguin    Signature:   Date:      1/30/2023       PLEASE FAX REQUESTED RECORDS BACK TO: [phone]

## 2023-01-31 DIAGNOSIS — R56.9 SEIZURE (HCC): ICD-10-CM

## 2023-01-31 DIAGNOSIS — R41.3 MEMORY LOSS, SHORT TERM: ICD-10-CM

## 2023-01-31 DIAGNOSIS — R47.89 SPEECH DYSFLUENCY: ICD-10-CM

## 2023-01-31 RX ORDER — LEVETIRACETAM 500 MG/1
TABLET ORAL
Qty: 60 TABLET | Refills: 1 | Status: SHIPPED | OUTPATIENT
Start: 2023-01-31 | End: 2023-02-22

## 2023-02-13 ENCOUNTER — OFFICE VISIT (OUTPATIENT)
Dept: MEDICAL GROUP | Facility: IMAGING CENTER | Age: 28
End: 2023-02-13
Payer: COMMERCIAL

## 2023-02-13 VITALS
HEART RATE: 95 BPM | TEMPERATURE: 98.6 F | RESPIRATION RATE: 16 BRPM | SYSTOLIC BLOOD PRESSURE: 100 MMHG | BODY MASS INDEX: 19.39 KG/M2 | DIASTOLIC BLOOD PRESSURE: 74 MMHG | OXYGEN SATURATION: 100 % | HEIGHT: 65 IN | WEIGHT: 116.4 LBS

## 2023-02-13 DIAGNOSIS — Z02.89 ENCOUNTER FOR COMPLETION OF FORM WITH PATIENT: ICD-10-CM

## 2023-02-13 DIAGNOSIS — G40.409 GRAND MAL SEIZURE (HCC): ICD-10-CM

## 2023-02-13 PROBLEM — G40.909 SEIZURE DISORDER (HCC): Status: RESOLVED | Noted: 2022-04-29 | Resolved: 2023-02-13

## 2023-02-13 PROCEDURE — 99212 OFFICE O/P EST SF 10 MIN: CPT

## 2023-02-13 ASSESSMENT — FIBROSIS 4 INDEX: FIB4 SCORE: 0.45

## 2023-02-13 NOTE — PROGRESS NOTES
Subjective:     CC:   Chief Complaint   Patient presents with    Other     Unemployment paper work and pt wants to get off the medication Keppera       HPI:   Viviane presents today to discuss:    Grandmal seizures  Patient continues to have uncontrolled grand mal seizures.  She reports having 6 seizure episodes, lasting seconds since last office visit.  She has not been able to start her Xcopri that was prescribed by neurology due to med not being available at the pharmacy.  Patient has been in contact with her neurologist to try and get this medication resolved.  She reports that she has been off Keppra due to side effects of depression.  Is scheduled to see psychiatry on .  She was seen by neurology on Saturday for an EEG and she is scheduled to follow-up with them in 3 weeks.  Due to patient's uncontrolled grand mal seizures, she has been unable to perform her job duties since .  's license was revoked by Neurology.   Patient presents today for paperwork-unemployment.    Past Medical History:   Diagnosis Date    Epilepsy (HCC)     Seizure (HCC)      Family History   Problem Relation Age of Onset    Cancer Mother         breast    Diabetes Sister     Hypertension Sister     Colorectal Cancer Neg Hx     Peritoneal Cancer Neg Hx     Tubal Cancer Neg Hx     Ovarian Cancer Neg Hx      History reviewed. No pertinent surgical history.  Social History     Tobacco Use    Smoking status: Former     Types: Cigarettes     Quit date: 2018     Years since quittin.7    Smokeless tobacco: Never    Tobacco comments:     quit about 5 years ago   Vaping Use    Vaping Use: Every day    Substances: Flavoring   Substance Use Topics    Alcohol use: Yes     Comment: occasional    Drug use: Never     Social History     Social History Narrative    Not on file     Current Outpatient Medications Ordered in Epic   Medication Sig Dispense Refill    levETIRAcetam (KEPPRA) 500 MG Tab TAKE 1 TABLET BY MOUTH 2 TIMES A  "DAY (Patient not taking: Reported on 2/13/2023) 60 Tablet 1    cenobamate (XCOPRI) 50 MG Tab tablet Take 50 mg by mouth every day. (Patient not taking: Reported on 2/13/2023)       No current [facility]-ordered facility-administered medications on file.     Patient has no known allergies.    ROS: see hpi  Gen: no fevers/chills  Pulm: no sob, no cough  CV: no chest pain, no palpitations, no edema  GI: no nausea/vomiting, no diarrhea  Skin: no rash    Objective:   Exam:  /74 (BP Location: Right arm, Patient Position: Sitting, BP Cuff Size: Small adult)   Pulse 95   Temp 37 °C (98.6 °F) (Temporal)   Resp 16   Ht 1.651 m (5' 5\")   Wt 52.8 kg (116 lb 6.4 oz)   LMP 01/30/2023   SpO2 100%   BMI 19.37 kg/m²    Body mass index is 19.37 kg/m².    Gen: Alert and oriented, No apparent distress.  HEENT: Head atraumatic, normocephalic. Pupils equal and round.  Neck: Neck is supple without lymphadenopathy.   Lungs: Normal effort, CTA bilaterally, no wheezes, rhonchi, or rales  CV: Regular rate and rhythm. No murmurs, rubs, or gallops.  ABD: +BS. Non-tender, non-distended. No rebound, rigidity, or guarding.  Ext: No clubbing, cyanosis, edema.    Assessment & Plan:     27 y.o. female with the following -     1. Encounter for completion of form with patient  2. Grand mal seizure (HCC)  Chronic and uncontrolled condition s/p 6 seizure episodes since last office visit on 1/30. Patient is not on any of her antiseizure medication prescribed to her by neurology due to unavailability of specialty medication.  She is a/p EEG on 2 days ago and is scheduled to follow-up with Neurology in 3 weeks.   Due to patient's uncontrolled grand mal seizures, she is rendered incapable of performing her job duties since 1/5.  Her 's license was revoked by Neurology.   Due to the circumstances, I have filled out her unemployment paperwork and submitted back to patient.   Strongly advised for patient to be seen by neurology soon as possible. "   She is scheduled to see psychiatry on 2/20.  Advised to follow-up as directed.  Instructed patient to go to ED if she develops any seizure episodes or suicidal ideation.    Medical Decision Making/Course:  In the course of preparing for this visit with review of the pertinent past medical history, recent and past clinic visits, current medications, and performing chart, immunization, medical history and medication reconciliation, and in the further course of obtaining the current history pertinent to the clinic visit today, performing an exam and evaluation, ordering and independently evaluating labs, tests, and/or procedures, prescribing any recommended new medications as noted above, providing any pertinent counseling and education and recommending further coordination of care. This was discussed with patient in a shared-decision making conversation, and they understand and agreed with plan of care.     Return if symptoms worsen or fail to improve.    ROSEANNE Shen.   [facility]    Please note that this dictation was created using voice recognition software. I have made every reasonable attempt to correct obvious errors, but I expect that there are errors of grammar and possibly content that I did not discover before finalizing the note.

## 2023-02-19 DIAGNOSIS — R47.89 SPEECH DYSFLUENCY: ICD-10-CM

## 2023-02-19 DIAGNOSIS — R41.3 MEMORY LOSS, SHORT TERM: ICD-10-CM

## 2023-02-19 DIAGNOSIS — R56.9 SEIZURE (HCC): ICD-10-CM

## 2023-02-22 RX ORDER — LEVETIRACETAM 500 MG/1
TABLET ORAL
Qty: 180 TABLET | Refills: 1 | Status: SHIPPED | OUTPATIENT
Start: 2023-02-22 | End: 2024-02-27

## 2023-02-22 NOTE — TELEPHONE ENCOUNTER
Received request via: Pharmacy    Was the patient seen in the last year in this department? Yes    Does the patient have an active prescription (recently filled or refills available) for medication(s) requested? No    Does the patient have long-term Plus and need 100 day supply (blood pressure, diabetes and cholesterol meds only)? Patient does not have SCP    Pharmacy comment: REQUEST FOR 90 DAYS PRESCRIPTION.

## 2023-04-27 ENCOUNTER — APPOINTMENT (OUTPATIENT)
Dept: NEUROLOGY | Facility: MEDICAL CENTER | Age: 28
End: 2023-04-27
Attending: PSYCHIATRY & NEUROLOGY

## 2023-05-02 ENCOUNTER — APPOINTMENT (OUTPATIENT)
Dept: RADIOLOGY | Facility: MEDICAL CENTER | Age: 28
End: 2023-05-02
Attending: EMERGENCY MEDICINE
Payer: COMMERCIAL

## 2023-05-02 ENCOUNTER — HOSPITAL ENCOUNTER (EMERGENCY)
Facility: MEDICAL CENTER | Age: 28
End: 2023-05-02
Attending: EMERGENCY MEDICINE
Payer: COMMERCIAL

## 2023-05-02 VITALS
OXYGEN SATURATION: 97 % | HEART RATE: 98 BPM | WEIGHT: 125.22 LBS | TEMPERATURE: 99.6 F | SYSTOLIC BLOOD PRESSURE: 103 MMHG | BODY MASS INDEX: 20.86 KG/M2 | RESPIRATION RATE: 16 BRPM | DIASTOLIC BLOOD PRESSURE: 57 MMHG | HEIGHT: 65 IN

## 2023-05-02 DIAGNOSIS — M54.2 NECK PAIN: ICD-10-CM

## 2023-05-02 DIAGNOSIS — M54.6 ACUTE MIDLINE THORACIC BACK PAIN: ICD-10-CM

## 2023-05-02 DIAGNOSIS — M54.50 ACUTE MIDLINE LOW BACK PAIN WITHOUT SCIATICA: ICD-10-CM
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
## 2023-05-02 PROCEDURE — 72100 X-RAY EXAM L-S SPINE 2/3 VWS: CPT

## 2023-05-02 PROCEDURE — 99283 EMERGENCY DEPT VISIT LOW MDM: CPT

## 2023-05-02 PROCEDURE — 700102 HCHG RX REV CODE 250 W/ 637 OVERRIDE(OP): Performed by: EMERGENCY MEDICINE

## 2023-05-02 PROCEDURE — 72070 X-RAY EXAM THORAC SPINE 2VWS: CPT

## 2023-05-02 PROCEDURE — 72040 X-RAY EXAM NECK SPINE 2-3 VW: CPT

## 2023-05-02 PROCEDURE — A9270 NON-COVERED ITEM OR SERVICE: HCPCS | Performed by: EMERGENCY MEDICINE

## 2023-05-02 RX ORDER — HYDROCODONE BITARTRATE AND ACETAMINOPHEN 5; 325 MG/1; MG/1
1 TABLET ORAL ONCE
Status: COMPLETED | OUTPATIENT
Start: 2023-05-02 | End: 2023-05-02

## 2023-05-02 RX ORDER — METHOCARBAMOL 750 MG/1
750 TABLET, FILM COATED ORAL 4 TIMES DAILY
Qty: 20 TABLET | Refills: 0 | Status: SHIPPED | OUTPATIENT
Start: 2023-05-02 | End: 2024-02-27

## 2023-05-02 RX ORDER — IBUPROFEN 600 MG/1
600 TABLET ORAL ONCE
Status: COMPLETED | OUTPATIENT
Start: 2023-05-02 | End: 2023-05-02

## 2023-05-02 RX ORDER — NAPROXEN 375 MG/1
375 TABLET ORAL 2 TIMES DAILY WITH MEALS
Qty: 20 TABLET | Refills: 0 | Status: SHIPPED | OUTPATIENT
Start: 2023-05-02 | End: 2024-02-27

## 2023-05-02 RX ADMIN — HYDROCODONE BITARTRATE AND ACETAMINOPHEN 1 TABLET: 5; 325 TABLET ORAL at 22:04

## 2023-05-02 RX ADMIN — IBUPROFEN 600 MG: 600 TABLET, FILM COATED ORAL at 22:04

## 2023-05-02 ASSESSMENT — FIBROSIS 4 INDEX: FIB4 SCORE: 0.45

## 2023-05-02 ASSESSMENT — PAIN DESCRIPTION - PAIN TYPE: TYPE: ACUTE PAIN

## 2023-05-03 NOTE — ED TRIAGE NOTES
"Chief Complaint   Patient presents with    Headache     Pt reports she fell off rock yesterday causing her to hit the back of her head. -LOC, -thinners.  Denies light sensitivity or vision changes.     Back Pain     Sharp pain radiating to bilateral legs       26 yo F to triage for above complaint. Pt states she was sitting on rock when daughter jumped on her causing her to lose her balance and fall. States initially she did not have pain but woke up this morning with symptoms. Reports taking tylenol with no relief.      Pt placed in lobby. Pt educated on triage process. Pt encouraged to alert staff for any changes.     Patient and staff wearing appropriate PPE    /75   Pulse (!) 113   Temp 36.7 °C (98.1 °F) (Temporal)   Resp 16   Ht 1.651 m (5' 5\")   Wt 56.8 kg (125 lb 3.5 oz)   SpO2 97%   BMI 20.84 kg/m²     "

## 2023-05-03 NOTE — ED PROVIDER NOTES
ER Provider Note    Scribed for Benson Rider D.O. by Benson Rider D.O.. 5/2/2023  10:03 PM    Primary Care Provider: ABISAI Shen    CHIEF COMPLAINT  Chief Complaint   Patient presents with    Headache     Pt reports she fell off rock yesterday causing her to hit the back of her head. -LOC, -thinners.  Denies light sensitivity or vision changes.     Back Pain     Sharp pain radiating to bilateral legs       HPI/ROS  LIMITATION TO HISTORY   None  OUTSIDE HISTORIAN(S):  None   Viviane Christian is a 27 y.o. female who presents to the Emergency Department for complaints of C-spine T-spine L-spine pain.  The pain radiates down both legs.  The pain is worse with movement.  She had a ground-level fall yesterday she lost her balance went backwards and hit a rock.  She does have some intermittent low back pain but has never been seen for.  The pain is severe.  She took Tylenol without effect.    She has no weakness or numbness to the legs but the pain radiates into the legs and is worse with movement and walking.        ROS as per HPI.    PAST MEDICAL HISTORY  Past Medical History:   Diagnosis Date    Epilepsy (HCC)     Seizure (HCC)        SURGICAL HISTORY  History reviewed. No pertinent surgical history.    FAMILY HISTORY  Family History   Problem Relation Age of Onset    Cancer Mother         breast    Diabetes Sister     Hypertension Sister     Colorectal Cancer Neg Hx     Peritoneal Cancer Neg Hx     Tubal Cancer Neg Hx     Ovarian Cancer Neg Hx        SOCIAL HISTORY   reports that she quit smoking about 5 years ago. She has never used smokeless tobacco. She reports current alcohol use. She reports that she does not use drugs.    CURRENT MEDICATIONS  Previous Medications    CENOBAMATE (XCOPRI) 50 MG TAB TABLET    Take 50 mg by mouth every day.    LEVETIRACETAM (KEPPRA) 500 MG TAB    TAKE 1 TABLET BY MOUTH TWICE A DAY       ALLERGIES  Patient has no known allergies.    PHYSICAL EXAM  /75  "  Pulse (!) 113   Temp 36.7 °C (98.1 °F) (Temporal)   Resp 16   Ht 1.651 m (5' 5\")   Wt 56.8 kg (125 lb 3.5 oz)   SpO2 97%   BMI 20.84 kg/m²     General: No acute distress.  HENT: Normocephalic, Mucus membranes are moist.   Chest: Lungs have even and unlabored respirations, Clear to auscultation.   Cardiovascular: Regular rate and regular rhythm, No peripheral cyanosis.  Abdomen: Non distended.  Neuro: Awake, Conversive, Able to relay recent events.  Strength of lower extremities is normal  Psychiatric: Calm and cooperative.   Back, there is diffuse tenderness of the C-spine T-spine and L-spine.  There is also paraspinal muscle tenderness.  She seems to have discomfort with movement.      EXTERNAL RECORDS REVIEWED  External records show history of seizures, no signs of narcotic use     INITIAL ASSESSMENT  Patient with ground-level fall, she fell backwards onto her back and now has significant pain.  She took Tylenol without effect.  She has no neurological deficits noted and suspect spinal cord injury.  She was medicated for pain, x-rays will be done to evaluate for fracture.    ED Observation Status? Yes; I am placing the patient in to an observation status due to a diagnostic uncertainty as well as therapeutic intensity. Patient placed in observation status at 10:07 PM, 5/2/2023.     Observation plan is as follows: We will treat for pain pending x-ray results    Upon Reevaluation, the patient's condition has: Improved; and will be discharged.    Patient discharged from ED Observation status at 5-20 23 at 10:50 PM     DIAGNOSTIC STUDIES    Labs:       EKG:   I have independently interpreted the above EKG.    Radiology:   The attending emergency physician has independently interpreted the diagnostic imaging associated with this visit and am waiting the final reading from the radiologist.   Preliminary interpretation is as follows: No fracture of the lumbar spine  Radiologist interpretation:   DX-THORACIC " SPINE-2 VIEWS   Final Result      Unremarkable thoracic spine.      DX-LUMBAR SPINE-2 OR 3 VIEWS   Final Result      Unremarkable lumbar spine series.                  INTERPRETING LOCATION:  [facility], [place], [address]      DX-CERVICAL SPINE-2 OR 3 VIEWS   Final Result      Negative cervical spine series.            COURSE & MEDICAL DECISION MAKING     COURSE AND PLAN  [unfilled]    ED Summary: Ground-level fall.  She complains of neck pain thoracic pain and lumbar pain.    X-rays were done there is no signs of fracture.  She medicated with Motrin and Tylenol her pain is improved and she is laying in the gurney and appears more comfortable.    For the fall and the pain no signs of fracture no signs of neurological deficit she is stable for discharge home with symptomatic care.    Decision tools and prescription drugs considered including, but not limited to: Robaxin, Naprosyn        Discharged home in stable condition    FINAL DIAGNOSIS  1. Ground-level fall    2. Neck pain    3. Acute midline thoracic back pain    4. Acute midline low back pain without sciatica        [unfilled]    The note accurately reflects work and decisions made by me.  Benson Rider D.O.  5/2/2023  10:52 PM

## 2023-05-03 NOTE — DISCHARGE INSTRUCTIONS
Use pain medication muscle Raxar as prescribed.  The muscle riser can cause drowsiness do not take it while you are driving.  Activity as tolerated.  Warm soaks or hot soaks whichever 1 feels better is recommended.  Please follow-up with your primary care doctor return for any change or worsening symptoms.

## 2023-05-03 NOTE — ED NOTES
Pt discharged to home. Pt was given follow up instructions and prescriptions for Naproxen, and Robaxin. Pt verbalized understanding of all instructions for discharge and is ambulatory out of ED with steady gait. AOx4

## 2024-02-27 ENCOUNTER — OFFICE VISIT (OUTPATIENT)
Dept: MEDICAL GROUP | Facility: CLINIC | Age: 29
End: 2024-02-27
Payer: MEDICAID

## 2024-02-27 VITALS
OXYGEN SATURATION: 99 % | BODY MASS INDEX: 23.86 KG/M2 | HEART RATE: 96 BPM | HEIGHT: 65 IN | WEIGHT: 143.2 LBS | TEMPERATURE: 98.1 F | DIASTOLIC BLOOD PRESSURE: 60 MMHG | SYSTOLIC BLOOD PRESSURE: 110 MMHG

## 2024-02-27 DIAGNOSIS — G40.409 GRAND MAL SEIZURE (HCC): ICD-10-CM

## 2024-02-27 DIAGNOSIS — G40.909 NONINTRACTABLE EPILEPSY WITHOUT STATUS EPILEPTICUS, UNSPECIFIED EPILEPSY TYPE (HCC): ICD-10-CM

## 2024-02-27 DIAGNOSIS — Z11.9 ENCOUNTER FOR SCREENING EXAMINATION FOR INFECTIOUS DISEASE: ICD-10-CM

## 2024-02-27 PROCEDURE — 3078F DIAST BP <80 MM HG: CPT | Performed by: STUDENT IN AN ORGANIZED HEALTH CARE EDUCATION/TRAINING PROGRAM

## 2024-02-27 PROCEDURE — 3074F SYST BP LT 130 MM HG: CPT | Performed by: STUDENT IN AN ORGANIZED HEALTH CARE EDUCATION/TRAINING PROGRAM

## 2024-02-27 PROCEDURE — 99203 OFFICE O/P NEW LOW 30 MIN: CPT | Mod: GE | Performed by: STUDENT IN AN ORGANIZED HEALTH CARE EDUCATION/TRAINING PROGRAM

## 2024-02-27 RX ORDER — CENOBAMATE 12.5-25MG
KIT ORAL
Qty: 1 EACH | Refills: 0 | Status: SHIPPED | OUTPATIENT
Start: 2024-02-27 | End: 2024-03-26

## 2024-02-27 SDOH — ECONOMIC STABILITY: INCOME INSECURITY: HOW HARD IS IT FOR YOU TO PAY FOR THE VERY BASICS LIKE FOOD, HOUSING, MEDICAL CARE, AND HEATING?: SOMEWHAT HARD

## 2024-02-27 SDOH — ECONOMIC STABILITY: INCOME INSECURITY: IN THE LAST 12 MONTHS, WAS THERE A TIME WHEN YOU WERE NOT ABLE TO PAY THE MORTGAGE OR RENT ON TIME?: NO

## 2024-02-27 SDOH — HEALTH STABILITY: PHYSICAL HEALTH: ON AVERAGE, HOW MANY MINUTES DO YOU ENGAGE IN EXERCISE AT THIS LEVEL?: 60 MIN

## 2024-02-27 SDOH — ECONOMIC STABILITY: FOOD INSECURITY: WITHIN THE PAST 12 MONTHS, YOU WORRIED THAT YOUR FOOD WOULD RUN OUT BEFORE YOU GOT MONEY TO BUY MORE.: NEVER TRUE

## 2024-02-27 SDOH — ECONOMIC STABILITY: FOOD INSECURITY: WITHIN THE PAST 12 MONTHS, THE FOOD YOU BOUGHT JUST DIDN'T LAST AND YOU DIDN'T HAVE MONEY TO GET MORE.: NEVER TRUE

## 2024-02-27 SDOH — HEALTH STABILITY: MENTAL HEALTH
STRESS IS WHEN SOMEONE FEELS TENSE, NERVOUS, ANXIOUS, OR CAN'T SLEEP AT NIGHT BECAUSE THEIR MIND IS TROUBLED. HOW STRESSED ARE YOU?: NOT AT ALL

## 2024-02-27 SDOH — HEALTH STABILITY: PHYSICAL HEALTH: ON AVERAGE, HOW MANY DAYS PER WEEK DO YOU ENGAGE IN MODERATE TO STRENUOUS EXERCISE (LIKE A BRISK WALK)?: 3 DAYS

## 2024-02-27 SDOH — ECONOMIC STABILITY: HOUSING INSECURITY: IN THE LAST 12 MONTHS, HOW MANY PLACES HAVE YOU LIVED?: 1

## 2024-02-27 ASSESSMENT — PATIENT HEALTH QUESTIONNAIRE - PHQ9
SUM OF ALL RESPONSES TO PHQ QUESTIONS 1-9: 3
5. POOR APPETITE OR OVEREATING: NOT AT ALL
7. TROUBLE CONCENTRATING ON THINGS, SUCH AS READING THE NEWSPAPER OR WATCHING TELEVISION: NOT AT ALL
6. FEELING BAD ABOUT YOURSELF - OR THAT YOU ARE A FAILURE OR HAVE LET YOURSELF OR YOUR FAMILY DOWN: NOT AL ALL
2. FEELING DOWN, DEPRESSED, IRRITABLE, OR HOPELESS: NOT AT ALL
9. THOUGHTS THAT YOU WOULD BE BETTER OFF DEAD, OR OF HURTING YOURSELF: NOT AT ALL
3. TROUBLE FALLING OR STAYING ASLEEP OR SLEEPING TOO MUCH: NEARLY EVERY DAY
1. LITTLE INTEREST OR PLEASURE IN DOING THINGS: NOT AT ALL
SUM OF ALL RESPONSES TO PHQ9 QUESTIONS 1 AND 2: 0
8. MOVING OR SPEAKING SO SLOWLY THAT OTHER PEOPLE COULD HAVE NOTICED. OR THE OPPOSITE, BEING SO FIGETY OR RESTLESS THAT YOU HAVE BEEN MOVING AROUND A LOT MORE THAN USUAL: NOT AT ALL
4. FEELING TIRED OR HAVING LITTLE ENERGY: NOT AT ALL

## 2024-02-27 ASSESSMENT — ENCOUNTER SYMPTOMS
RESPIRATORY NEGATIVE: 1
GASTROINTESTINAL NEGATIVE: 1
MUSCULOSKELETAL NEGATIVE: 1
EYES NEGATIVE: 1
CONSTITUTIONAL NEGATIVE: 1
NEUROLOGICAL NEGATIVE: 1
CARDIOVASCULAR NEGATIVE: 1

## 2024-02-27 ASSESSMENT — SOCIAL DETERMINANTS OF HEALTH (SDOH)
HOW OFTEN DO YOU ATTEND CHURCH OR RELIGIOUS SERVICES?: NEVER
HOW MANY DRINKS CONTAINING ALCOHOL DO YOU HAVE ON A TYPICAL DAY WHEN YOU ARE DRINKING: 1 OR 2
ARE YOU MARRIED, WIDOWED, DIVORCED, SEPARATED, NEVER MARRIED, OR LIVING WITH A PARTNER?: LIVING WITH PARTNER
HOW OFTEN DO YOU ATTENT MEETINGS OF THE CLUB OR ORGANIZATION YOU BELONG TO?: NEVER
HOW OFTEN DO YOU HAVE SIX OR MORE DRINKS ON ONE OCCASION: NEVER
IN A TYPICAL WEEK, HOW MANY TIMES DO YOU TALK ON THE PHONE WITH FAMILY, FRIENDS, OR NEIGHBORS?: MORE THAN THREE TIMES A WEEK
DO YOU BELONG TO ANY CLUBS OR ORGANIZATIONS SUCH AS CHURCH GROUPS UNIONS, FRATERNAL OR ATHLETIC GROUPS, OR SCHOOL GROUPS?: NO
DO YOU BELONG TO ANY CLUBS OR ORGANIZATIONS SUCH AS CHURCH GROUPS UNIONS, FRATERNAL OR ATHLETIC GROUPS, OR SCHOOL GROUPS?: NO
HOW OFTEN DO YOU ATTEND CHURCH OR RELIGIOUS SERVICES?: NEVER
HOW OFTEN DO YOU GET TOGETHER WITH FRIENDS OR RELATIVES?: TWICE A WEEK
IN A TYPICAL WEEK, HOW MANY TIMES DO YOU TALK ON THE PHONE WITH FAMILY, FRIENDS, OR NEIGHBORS?: MORE THAN THREE TIMES A WEEK
HOW OFTEN DO YOU HAVE A DRINK CONTAINING ALCOHOL: MONTHLY OR LESS
HOW HARD IS IT FOR YOU TO PAY FOR THE VERY BASICS LIKE FOOD, HOUSING, MEDICAL CARE, AND HEATING?: SOMEWHAT HARD
HOW OFTEN DO YOU ATTENT MEETINGS OF THE CLUB OR ORGANIZATION YOU BELONG TO?: NEVER
WITHIN THE PAST 12 MONTHS, YOU WORRIED THAT YOUR FOOD WOULD RUN OUT BEFORE YOU GOT THE MONEY TO BUY MORE: NEVER TRUE
ARE YOU MARRIED, WIDOWED, DIVORCED, SEPARATED, NEVER MARRIED, OR LIVING WITH A PARTNER?: LIVING WITH PARTNER
HOW OFTEN DO YOU GET TOGETHER WITH FRIENDS OR RELATIVES?: TWICE A WEEK

## 2024-02-27 ASSESSMENT — FIBROSIS 4 INDEX: FIB4 SCORE: 0.47

## 2024-02-27 ASSESSMENT — LIFESTYLE VARIABLES
HOW OFTEN DO YOU HAVE A DRINK CONTAINING ALCOHOL: MONTHLY OR LESS
HOW MANY STANDARD DRINKS CONTAINING ALCOHOL DO YOU HAVE ON A TYPICAL DAY: 1 OR 2
AUDIT-C TOTAL SCORE: 1
SKIP TO QUESTIONS 9-10: 1
HOW OFTEN DO YOU HAVE SIX OR MORE DRINKS ON ONE OCCASION: NEVER

## 2024-02-27 NOTE — PROGRESS NOTES
Subjective:     CC:  Diagnoses of Nonintractable epilepsy without status epilepticus, unspecified epilepsy type (HCC), Grand mal seizure (HCC), and Encounter for screening examination for infectious disease were pertinent to this visit.    HISTORY OF THE PRESENT ILLNESS: Patient is a 28 y.o. female. This pleasant patient is here today to establish care and discuss referral to neuro. His/her prior PCP was None    NO seizures in aprox 6 months. Has been out of medications for about 1 month. Was prior seeing neurologist in [facility] but would like to transfer up here for care. .    Problem   Grand Mal Seizure (Hcc)    History of grand mal seizures and epileptic seizures see other HPI     Epileptic Seizure (Hcc)    Patient with history of multiple epileptic seizures.  Was seen by neurology in [facility].  She has been on Xcopri 250 milligram for multiple months but has been out for the past month.  She would like a referral today for additional neurology evaluation.  She denies any recent seizure activity the most recent being approximately 6 or 7 months ago.  She denies any history of of side effects from the Xcopri and has been doing well on the medication.         Current Outpatient Medications Ordered in Epic   Medication Sig Dispense Refill    Cenobamate (XCOPRI) 14 x 12.5 MG & 14 x 25 MG Tablet Therapy Pack Take 12.5 mg by mouth every day for 14 days, THEN 25 mg every day for 14 days. 1 Each 0    [START ON 3/28/2024] Cenobamate 14 x 50 MG & 14 x100 MG Tablet Therapy Pack Take 50 mg by mouth every day for 14 days, THEN 100 mg every day for 14 days. 1 Each 0    [START ON 4/23/2024] Cenobamate 14 x 150 MG & 14 x200 MG Tablet Therapy Pack Take 150 mg by mouth every day for 14 days, THEN 200 mg every day for 14 days. 1 Each 0     No current [facility]-ordered facility-administered medications on file.       ROS:   Review of Systems   Constitutional: Negative.    HENT: Negative.     Eyes: Negative.    Respiratory: Negative.    "  Cardiovascular: Negative.    Gastrointestinal: Negative.    Genitourinary: Negative.    Musculoskeletal: Negative.    Skin: Negative.    Neurological: Negative.    Endo/Heme/Allergies: Negative.          Objective:       Exam: /60 (BP Location: Left arm, Patient Position: Sitting, BP Cuff Size: Adult)   Pulse 96   Temp 36.7 °C (98.1 °F) (Temporal)   Ht 1.651 m (5' 5\")   Wt 65 kg (143 lb 3.2 oz)   SpO2 99%  Body mass index is 23.83 kg/m².    Physical Exam  Constitutional:       Appearance: Normal appearance.   HENT:      Head: Normocephalic and atraumatic.      Nose: Nose normal.      Mouth/Throat:      Mouth: Mucous membranes are moist.      Pharynx: Oropharynx is clear.   Eyes:      Extraocular Movements: Extraocular movements intact.      Conjunctiva/sclera: Conjunctivae normal.   Cardiovascular:      Rate and Rhythm: Normal rate and regular rhythm.      Pulses: Normal pulses.      Heart sounds: Normal heart sounds.   Pulmonary:      Effort: Pulmonary effort is normal.      Breath sounds: Normal breath sounds.   Abdominal:      General: Abdomen is flat.      Palpations: Abdomen is soft.   Musculoskeletal:         General: Normal range of motion.      Cervical back: Normal range of motion.   Skin:     General: Skin is warm.      Capillary Refill: Capillary refill takes less than 2 seconds.   Neurological:      General: No focal deficit present.      Mental Status: She is alert and oriented to person, place, and time.           Assessment & Plan:   28 y.o. female with the following -    Problem List Items Addressed This Visit       Epileptic seizure (HCC)     Will send a referral to neurology today.  So she can establish care here in [place].  Will restart loading dose of Xcopri with slow titration up to 200 mg.  Which was started at 12.5 mg for 2 weeks then increase to 25 mg for 2 weeks and increase to 50 mg for 2 weeks and 100 mg for 2 weeks and then finally 150 and 200 mg for 2 weeks each.  - Will get " CBC and CMP at this time         Relevant Medications    Cenobamate (XCOPRI) 14 x 12.5 MG & 14 x 25 MG Tablet Therapy Pack    Cenobamate 14 x 50 MG & 14 x100 MG Tablet Therapy Pack (Start on 3/28/2024)    Cenobamate 14 x 150 MG & 14 x200 MG Tablet Therapy Pack (Start on 4/23/2024)    Other Relevant Orders    CBC WITH DIFFERENTIAL    Comp Metabolic Panel    HIV AG/AB COMBO ASSAY SCREENING    HEP C VIRUS ANTIBODY    Controlled Substance Treatment Agreement    Referral to Neurology    Grand mal seizure (HCC)     See other plan         Relevant Medications    Cenobamate (XCOPRI) 14 x 12.5 MG & 14 x 25 MG Tablet Therapy Pack    Cenobamate 14 x 50 MG & 14 x100 MG Tablet Therapy Pack (Start on 3/28/2024)    Cenobamate 14 x 150 MG & 14 x200 MG Tablet Therapy Pack (Start on 4/23/2024)    Other Relevant Orders    CBC WITH DIFFERENTIAL    Comp Metabolic Panel    HIV AG/AB COMBO ASSAY SCREENING    HEP C VIRUS ANTIBODY    Controlled Substance Treatment Agreement    Referral to Neurology     Other Visit Diagnoses       Encounter for screening examination for infectious disease        Relevant Orders    HIV AG/AB COMBO ASSAY SCREENING    HEP C VIRUS ANTIBODY              Return in about 1 year (around 2/27/2025).

## 2024-02-27 NOTE — ASSESSMENT & PLAN NOTE
Will send a referral to neurology today.  So she can establish care here in [place].  Will restart loading dose of Xcopri with slow titration up to 200 mg.  Which was started at 12.5 mg for 2 weeks then increase to 25 mg for 2 weeks and increase to 50 mg for 2 weeks and 100 mg for 2 weeks and then finally 150 and 200 mg for 2 weeks each.  - Will get CBC and CMP at this time

## 2024-03-11 ENCOUNTER — HOSPITAL ENCOUNTER (OUTPATIENT)
Dept: LAB | Facility: MEDICAL CENTER | Age: 29
End: 2024-03-11
Attending: STUDENT IN AN ORGANIZED HEALTH CARE EDUCATION/TRAINING PROGRAM
Payer: MEDICAID

## 2024-03-11 DIAGNOSIS — G40.409 GRAND MAL SEIZURE (HCC): ICD-10-CM

## 2024-03-11 DIAGNOSIS — Z11.9 ENCOUNTER FOR SCREENING EXAMINATION FOR INFECTIOUS DISEASE: ICD-10-CM

## 2024-03-11 DIAGNOSIS — G40.909 NONINTRACTABLE EPILEPSY WITHOUT STATUS EPILEPTICUS, UNSPECIFIED EPILEPSY TYPE (HCC): ICD-10-CM

## 2024-03-11 LAB
ALBUMIN SERPL BCP-MCNC: 4.7 G/DL (ref 3.2–4.9)
ALBUMIN/GLOB SERPL: 1.7 G/DL
ALP SERPL-CCNC: 48 U/L (ref 30–99)
ALT SERPL-CCNC: 16 U/L (ref 2–50)
ANION GAP SERPL CALC-SCNC: 10 MMOL/L (ref 7–16)
AST SERPL-CCNC: 30 U/L (ref 12–45)
BASOPHILS # BLD AUTO: 0.6 % (ref 0–1.8)
BASOPHILS # BLD: 0.04 K/UL (ref 0–0.12)
BILIRUB SERPL-MCNC: 0.4 MG/DL (ref 0.1–1.5)
BUN SERPL-MCNC: 11 MG/DL (ref 8–22)
CALCIUM ALBUM COR SERPL-MCNC: 8.7 MG/DL (ref 8.5–10.5)
CALCIUM SERPL-MCNC: 9.3 MG/DL (ref 8.5–10.5)
CHLORIDE SERPL-SCNC: 105 MMOL/L (ref 96–112)
CO2 SERPL-SCNC: 22 MMOL/L (ref 20–33)
CREAT SERPL-MCNC: 0.74 MG/DL (ref 0.5–1.4)
EOSINOPHIL # BLD AUTO: 0.05 K/UL (ref 0–0.51)
EOSINOPHIL NFR BLD: 0.8 % (ref 0–6.9)
ERYTHROCYTE [DISTWIDTH] IN BLOOD BY AUTOMATED COUNT: 43.8 FL (ref 35.9–50)
GFR SERPLBLD CREATININE-BSD FMLA CKD-EPI: 112 ML/MIN/1.73 M 2
GLOBULIN SER CALC-MCNC: 2.8 G/DL (ref 1.9–3.5)
GLUCOSE SERPL-MCNC: 82 MG/DL (ref 65–99)
HCT VFR BLD AUTO: 39.1 % (ref 37–47)
HCV AB SER QL: NORMAL
HGB BLD-MCNC: 13.2 G/DL (ref 12–16)
HIV 1+2 AB+HIV1 P24 AG SERPL QL IA: NORMAL
IMM GRANULOCYTES # BLD AUTO: 0.02 K/UL (ref 0–0.11)
IMM GRANULOCYTES NFR BLD AUTO: 0.3 % (ref 0–0.9)
LYMPHOCYTES # BLD AUTO: 2.68 K/UL (ref 1–4.8)
LYMPHOCYTES NFR BLD: 41.9 % (ref 22–41)
MCH RBC QN AUTO: 29.8 PG (ref 27–33)
MCHC RBC AUTO-ENTMCNC: 33.8 G/DL (ref 32.2–35.5)
MCV RBC AUTO: 88.3 FL (ref 81.4–97.8)
MONOCYTES # BLD AUTO: 0.4 K/UL (ref 0–0.85)
MONOCYTES NFR BLD AUTO: 6.3 % (ref 0–13.4)
NEUTROPHILS # BLD AUTO: 3.21 K/UL (ref 1.82–7.42)
NEUTROPHILS NFR BLD: 50.1 % (ref 44–72)
NRBC # BLD AUTO: 0 K/UL
NRBC BLD-RTO: 0 /100 WBC (ref 0–0.2)
PLATELET # BLD AUTO: 305 K/UL (ref 164–446)
PMV BLD AUTO: 10.4 FL (ref 9–12.9)
POTASSIUM SERPL-SCNC: 4.7 MMOL/L (ref 3.6–5.5)
PROT SERPL-MCNC: 7.5 G/DL (ref 6–8.2)
RBC # BLD AUTO: 4.43 M/UL (ref 4.2–5.4)
SODIUM SERPL-SCNC: 137 MMOL/L (ref 135–145)
WBC # BLD AUTO: 6.4 K/UL (ref 4.8–10.8)

## 2024-03-11 PROCEDURE — 87389 HIV-1 AG W/HIV-1&-2 AB AG IA: CPT

## 2024-03-11 PROCEDURE — 80053 COMPREHEN METABOLIC PANEL: CPT

## 2024-03-11 PROCEDURE — 86803 HEPATITIS C AB TEST: CPT

## 2024-03-11 PROCEDURE — 36415 COLL VENOUS BLD VENIPUNCTURE: CPT

## 2024-03-11 PROCEDURE — 85025 COMPLETE CBC W/AUTO DIFF WBC: CPT

## 2024-04-17 ENCOUNTER — TELEPHONE (OUTPATIENT)
Dept: MEDICAL GROUP | Facility: CLINIC | Age: 29
End: 2024-04-17
Payer: MEDICAID

## 2024-09-09 ENCOUNTER — OFFICE VISIT (OUTPATIENT)
Dept: MEDICAL GROUP | Facility: CLINIC | Age: 29
End: 2024-09-09
Payer: MEDICAID

## 2024-09-09 VITALS
SYSTOLIC BLOOD PRESSURE: 110 MMHG | BODY MASS INDEX: 23.49 KG/M2 | OXYGEN SATURATION: 97 % | DIASTOLIC BLOOD PRESSURE: 76 MMHG | HEIGHT: 65 IN | HEART RATE: 64 BPM | TEMPERATURE: 98.1 F | WEIGHT: 141 LBS | RESPIRATION RATE: 16 BRPM

## 2024-09-09 DIAGNOSIS — D22.9 NEVUS: ICD-10-CM

## 2024-09-09 DIAGNOSIS — R22.31 MASS OF RIGHT AXILLA: ICD-10-CM

## 2024-09-09 DIAGNOSIS — N63.31 MASS OF AXILLARY TAIL OF RIGHT BREAST: ICD-10-CM

## 2024-09-09 PROCEDURE — 3078F DIAST BP <80 MM HG: CPT

## 2024-09-09 PROCEDURE — 99214 OFFICE O/P EST MOD 30 MIN: CPT

## 2024-09-09 PROCEDURE — 3074F SYST BP LT 130 MM HG: CPT

## 2024-09-09 RX ORDER — CENOBAMATE 200 MG/1
200 TABLET, FILM COATED ORAL NIGHTLY
COMMUNITY
Start: 2024-08-30

## 2024-09-09 RX ORDER — CENOBAMATE 50 MG/1
50 TABLET, FILM COATED ORAL NIGHTLY
COMMUNITY
Start: 2024-08-24

## 2024-09-09 RX ORDER — SUMATRIPTAN 25 MG/1
TABLET, FILM COATED ORAL
COMMUNITY
Start: 2024-07-18

## 2024-09-09 ASSESSMENT — FIBROSIS 4 INDEX: FIB4 SCORE: 0.74

## 2024-09-09 NOTE — PROGRESS NOTES
"This note is formatted in an APSO format, for additional subjective and objective evaluation please scroll to the bottom of the note.    CC: Axillary mass    Assessment/Plan:  Problem List Items Addressed This Visit       Lump or mass in breast    Relevant Orders    Referral to High Risk Breast Clinic    US-BREAST LIMITED-RIGHT    Mass of right axilla     Axillary mass previously evaluated as benign but has been increasing in size.   - ultrasound  - labs ordered         Relevant Orders    CBC WITH DIFFERENTIAL    Nevus     Mole over right nipple. Approx 0.5cm. Pt reports it has increased in size.  - schedule biopsy           Orders Placed This Encounter    US-BREAST LIMITED-RIGHT    CBC WITH DIFFERENTIAL    Referral to High Risk Breast Clinic    XCOPRI 200 MG Tab    XCOPRI 50 MG Tab tablet    SUMAtriptan (IMITREX) 25 MG Tab tablet       No follow-ups on file.    HISTORY OF PRESENT ILLNESS:   Lumps in armpit and chest. Started at 17 years old with a breast lump. She reports that there are more now. She had these worked up in 2022 with biopsy (benign pathology) but she was lost to f/u due to insurance issues. Now she would like a referral. Lump in her right armpit is TTP. Mom was diagnosed with breast cancer at 27. Grandmother also has breast cancer.    Problem   Mass of Right Axilla   Nevus   Lump Or Mass in Breast         Exam:    /76 (BP Location: Left arm, Patient Position: Sitting, BP Cuff Size: Adult)   Pulse 64   Temp 36.7 °C (98.1 °F) (Temporal)   Resp 16   Ht 1.651 m (5' 5\")   Wt 64 kg (141 lb)   SpO2 97%   BMI 23.46 kg/m²  Body mass index is 23.46 kg/m².    Gen: Well appearing. No apparent distress. Well developed. Sitting comfortably on chair  HEENT: NCAT, MMM  Neck: Supple, FROM  Chest: No deformities, Equal chest expansion  Lungs: Normal effort, CTA bilaterally.  CV: Regular rate and rhythm. Pulse palpable. No murmur  Abd: Non-distended.  Ext: No cyanosis. No edema.  Skin: Warm/dry. No " visible rashes.  Neuro: Non-focal. A&Ox4.  Psych: Normal behavior, normal affect    Breast exam conducted with pt consent. Chaperone in the room for the entire exam was Maryam Pleitez.  Breast exam: Normal breast tissue bilaterally. 2cm non-fluctuant mobile mass in right axilla, mildly tender. 0.5 cm nevus over right nipple.      Sergio Jones MD  [facility] Resident

## 2024-09-10 NOTE — ASSESSMENT & PLAN NOTE
Axillary mass previously evaluated as benign but has been increasing in size.   - ultrasound  - labs ordered

## 2024-09-20 ENCOUNTER — HOSPITAL ENCOUNTER (OUTPATIENT)
Dept: RADIOLOGY | Facility: MEDICAL CENTER | Age: 29
End: 2024-09-20
Payer: MEDICAID

## 2024-09-20 DIAGNOSIS — N63.31 MASS OF AXILLARY TAIL OF RIGHT BREAST: ICD-10-CM

## 2024-09-20 PROCEDURE — G0279 TOMOSYNTHESIS, MAMMO: HCPCS

## 2024-09-20 PROCEDURE — 76642 ULTRASOUND BREAST LIMITED: CPT | Mod: RT

## 2024-09-26 ENCOUNTER — HOSPITAL ENCOUNTER (OUTPATIENT)
Dept: RADIOLOGY | Facility: MEDICAL CENTER | Age: 29
End: 2024-09-26
Payer: MEDICAID

## 2024-09-26 DIAGNOSIS — R92.8 ABNORMAL FINDINGS ON DIAGNOSTIC IMAGING OF BREAST: ICD-10-CM

## 2024-09-26 LAB — PATHOLOGY CONSULT NOTE: NORMAL

## 2024-09-26 PROCEDURE — 88305 TISSUE EXAM BY PATHOLOGIST: CPT

## 2024-09-26 PROCEDURE — 88341 IMHCHEM/IMCYTCHM EA ADD ANTB: CPT

## 2024-09-26 PROCEDURE — 19083 BX BREAST 1ST LESION US IMAG: CPT | Mod: LT

## 2024-09-26 PROCEDURE — 88342 IMHCHEM/IMCYTCHM 1ST ANTB: CPT

## 2024-09-30 ENCOUNTER — TELEPHONE (OUTPATIENT)
Dept: RADIOLOGY | Facility: MEDICAL CENTER | Age: 29
End: 2024-09-30
Payer: MEDICAID

## 2024-10-01 ENCOUNTER — TELEPHONE (OUTPATIENT)
Dept: MEDICAL GROUP | Facility: CLINIC | Age: 29
End: 2024-10-01
Payer: MEDICAID

## 2024-11-12 ENCOUNTER — OFFICE VISIT (OUTPATIENT)
Dept: SURGERY | Facility: MEDICAL CENTER | Age: 29
End: 2024-11-12
Payer: MEDICAID

## 2024-11-12 VITALS
HEART RATE: 72 BPM | WEIGHT: 125 LBS | DIASTOLIC BLOOD PRESSURE: 79 MMHG | TEMPERATURE: 97.3 F | HEIGHT: 65 IN | SYSTOLIC BLOOD PRESSURE: 119 MMHG | BODY MASS INDEX: 20.83 KG/M2

## 2024-11-12 DIAGNOSIS — Z91.89 AT HIGH RISK FOR BREAST CANCER: ICD-10-CM

## 2024-11-12 DIAGNOSIS — R22.31 MASS OF RIGHT AXILLA: ICD-10-CM

## 2024-11-12 DIAGNOSIS — Z80.3 FAMILY HISTORY OF BREAST CANCER: ICD-10-CM

## 2024-11-12 DIAGNOSIS — D24.2 FIBROADENOMA OF BOTH BREASTS: ICD-10-CM

## 2024-11-12 DIAGNOSIS — D24.1 FIBROADENOMA OF BOTH BREASTS: ICD-10-CM

## 2024-11-12 PROCEDURE — 3074F SYST BP LT 130 MM HG: CPT | Performed by: SURGERY

## 2024-11-12 PROCEDURE — 3078F DIAST BP <80 MM HG: CPT | Performed by: SURGERY

## 2024-11-12 PROCEDURE — 99205 OFFICE O/P NEW HI 60 MIN: CPT | Performed by: SURGERY

## 2024-11-12 ASSESSMENT — ENCOUNTER SYMPTOMS
BACK PAIN: 1
UNEXPECTED WEIGHT CHANGE: 1
FATIGUE: 1
APPETITE CHANGE: 1
SEIZURES: 1
DIARRHEA: 1
NAUSEA: 1
MYALGIAS: 1
VOMITING: 1

## 2024-11-12 ASSESSMENT — FIBROSIS 4 INDEX: FIB4 SCORE: 0.74

## 2024-11-12 NOTE — PROGRESS NOTES
Subjective     Marin Christian is a 29 y.o. female who presents for evaluation of bilateral breast fibroadenomas and a subjective right axillary lump.  She reports that she has bilateral breast lumps that are longstanding, but that every time she goes in to be seen it feels like she has more.  She has constant breast pain, left greater than right, that improves with warm showers.  She does also report occasional bilateral green/mucus-appearing nipple discharge, as well as a left nipple area of darkening that has slowly enlarged over time.  Bilateral nipples were inverted before she  her baby, but now they are everted.    Routine self breast exams: Yes  Breast pain: Yes  Nipple discharge: Yes  Skin changes: Yes  Masses: Yes  Contour/nipple changes: Yes  Previous breast biopsy or surgery: Yes (detailed below)    Age at menarche: 9  Age at menopause: premenopausal  Age at first birth: 22    Hormone replacement therapy: No     Family history of cancer: Mother breast cancer age 27, MGM breast cancer age 40.    - The patient underwent [employer] genetic testing that was negative for deleterious BRCA1/2 mutation but has not had full panel testing performed.    Lifetime (5-yr) breast cancer risk calculations  ANGELO/Tyrer-Cuzick v8: 36.3 % (2.0 %)    Imaging  - Bilateral diagnostic mammogram ([facility]) 2024: Right UOQ marker noted. No suspicious findings. BIRADS 4a, density C.  - Bilateral limited ultrasound ([facility]) 2024: BIRADS 4a.     - Right 01:00 6cmFN 1.9cm well-defined mass (previously 3.4cm ).      - Right axilla without visible abnormality.     - Left 05:00 2cmFN 1.1cm well-defined oval mass (previously 0.7cm).     - Left 02:00 periareolar 1.8cm simple cyst.     - Left 04:00 periareolar 0.5cm simple cyst.     - Left axilla without visible abnormality.  All imaging personally reviewed (internal and external images).    Pathology  Bilateral breast ultrasound-guided  CNBxs:  - Right 09:00 4cmFN 2022: Fibroadenoma with nodular adenosis  - Left 05:00 2cmFN 2024: Fibroadenoma with sclerosing adenosis    Past Medical History   Past Medical History:   Diagnosis Date    Epilepsy (HCC)     Seizure (HCC)        Surgical History  History reviewed. No pertinent surgical history.    Family History  Family History   Problem Relation Age of Onset    Breast Cancer Mother 20 - 29    Diabetes Sister     Hypertension Sister     Breast Cancer Maternal Grandmother     Colorectal Cancer Neg Hx     Peritoneal Cancer Neg Hx     Tubal Cancer Neg Hx     Ovarian Cancer Neg Hx        Social History  Social History     Socioeconomic History    Marital status: Single     Spouse name: Not on file    Number of children: Not on file    Years of education: Not on file    Highest education level: Some college, no degree   Occupational History    Not on file   Tobacco Use    Smoking status: Former     Current packs/day: 0.00     Types: Cigarettes     Quit date: 2018     Years since quittin.5    Smokeless tobacco: Never    Tobacco comments:     quit about 5 years ago   Vaping Use    Vaping status: Every Day    Substances: Nicotine, Flavoring   Substance and Sexual Activity    Alcohol use: Yes     Comment: occasional    Drug use: Not Currently    Sexual activity: Not on file   Other Topics Concern    Not on file   Social History Narrative    Not on file     Social Drivers of Health     Financial Resource Strain: Medium Risk (2024)    Overall Financial Resource Strain (CARDIA)     Difficulty of Paying Living Expenses: Somewhat hard   Food Insecurity: No Food Insecurity (2024)    Hunger Vital Sign     Worried About Running Out of Food in the Last Year: Never true     Ran Out of Food in the Last Year: Never true   Transportation Needs: No Transportation Needs (2024)    PRAPARE - Transportation     Lack of Transportation (Medical): No     Lack of Transportation (Non-Medical): No  "  Physical Activity: Sufficiently Active (2/27/2024)    Exercise Vital Sign     Days of Exercise per Week: 3 days     Minutes of Exercise per Session: 60 min   Stress: No Stress Concern Present (2/27/2024)    [employer] [place] [employer] - Occupational Stress Questionnaire     Feeling of Stress : Not at all   Social Connections: Moderately Isolated (2/27/2024)    Social Connection and Isolation Panel [NHANES]     Frequency of Communication with Friends and Family: More than three times a week     Frequency of Social Gatherings with Friends and Family: Twice a week     Attends Judaism Services: Never     Active Member of Clubs or Organizations: No     Attends Club or Organization Meetings: Never     Marital Status: Living with partner   Intimate Partner Violence: Not on file   Housing Stability: Low Risk  (2/27/2024)    Housing Stability Vital Sign     Unable to Pay for Housing in the Last Year: No     Number of Places Lived in the Last Year: 1     Unstable Housing in the Last Year: No       Review of Systems  Review of Systems   Constitutional:  Positive for appetite change, fatigue and unexpected weight change.   Gastrointestinal:  Positive for diarrhea, nausea and vomiting.   Musculoskeletal:  Positive for back pain and myalgias.   Neurological:  Positive for seizures.   All other systems reviewed and are negative.       Objective   /79 (BP Location: Left arm, Patient Position: Sitting, BP Cuff Size: Large adult)   Pulse 72   Temp 36.3 °C (97.3 °F) (Temporal)   Ht 1.651 m (5' 5\")   Wt 56.7 kg (125 lb)   BMI 20.80 kg/m²    Physical Exam  Vitals and nursing note reviewed.   Constitutional:       General: She is not in acute distress.     Appearance: Normal appearance.   HENT:      Head: Normocephalic and atraumatic.      Right Ear: External ear normal.      Left Ear: External ear normal.      Nose: Nose normal.      Mouth/Throat:      Pharynx: Oropharynx is clear.   Eyes:      General: No " scleral icterus.     Conjunctiva/sclera: Conjunctivae normal.   Cardiovascular:      Rate and Rhythm: Normal rate and regular rhythm.      Heart sounds: Normal heart sounds. No murmur heard.     No friction rub. No gallop.   Pulmonary:      Effort: Pulmonary effort is normal. No respiratory distress.      Breath sounds: Normal breath sounds. No wheezing, rhonchi or rales.   Chest:   Breasts:     Sin Score is 5.      Breasts are symmetrical.      Right: Mass present. No swelling, bleeding, inverted nipple, nipple discharge or skin change.      Left: Mass present. No swelling, bleeding, inverted nipple, nipple discharge or skin change.          Comments: Bilateral breasts examined in the upright and supine positions.  No suspicious skin changes (erythema, peau d'orange).  No unexpected contour abnormalities.  Bilateral breast tissue extremely dense and nodular with dominant masses in the right lateral, left lower outer, and left periareolar upper outer breast.  These are all smooth, round, and freely mobile.  Bilateral nipples everted without expressible discharge.  No palpable cervical, supraclavicular, or axillary adenopathy bilaterally.  Bedside ultrasound performed with the GE 12mHz linear probe confirming benign-appearing masses/cysts with HydroMARK visible in the left 05:00 2cmFN location.  Abdominal:      General: Abdomen is flat. There is no distension.      Palpations: Abdomen is soft. There is no mass.   Musculoskeletal:         General: No swelling or deformity. Normal range of motion.      Cervical back: Neck supple.   Lymphadenopathy:      Cervical: No cervical adenopathy.      Upper Body:      Right upper body: No supraclavicular or axillary adenopathy.      Left upper body: No supraclavicular or axillary adenopathy.   Skin:     General: Skin is warm and dry.      Capillary Refill: Capillary refill takes less than 2 seconds.   Neurological:      General: No focal deficit present.      Mental Status:  She is alert and oriented to person, place, and time.   Psychiatric:         Mood and Affect: Mood normal.         Behavior: Behavior normal.         Thought Content: Thought content normal.         Judgment: Judgment normal.         [facility]G Performance Status categories: 0= Fully active, able to carry on all pre-disease performance without restriction.    Assessment & Plan   The patient is a delightful 29 y.o. female with bilateral fibroadenomas and fibrocystic changes bilaterally.  We discussed that fibroadenomas are benign, and the indications for excision are usually size >3cm, interval growth, or symptoms.  At this point the patient would like to continue with watchful waiting, as there is always risk of recurrence even with complete excision and it is likely that she may continue to form additional fibroadenomas given her breast density and the number of nodules seen currently.      We discussed the option of genetic counseling and genetic testing.  She meets NCCN criteria for genetic testing due to her mother's age at diagnosis.  At this point the patient is interested in proceeding; referral placed to [facility].    Consideration of chemoprevention: The patient meets criteria for consideration of chemoprevention to reduce the risk of developing breast cancer (5-yr risk > 1.66%).  She would like to think about this more before proceeding.  Will discuss again.    High risk surveillance: The patient meets criteria for high risk surveillance (lifetime risk > 20%).  This consists of clinical breast exam every 6 months with annual mammograms alternating with annual MRIs every 6 months.  We discussed the sensitivity/specificity of MRI, the potential for needing benign biopsies (false positive results), and the benefit of detecting breast cancer as soon as possible.  All questions answered in detail.     - Next clinical breast exam: 03/2025, 09/2025     - Next breast imaging: MRI 03/2025, sMMG 09/2025    The  patient did bring up the option of bilateral risk-reducing mastectomy.  I discussed that this is not usually recommended without a known deleterious genetic mutation due to lack of survival benefit and significant risk associated with the surgical intervention.  We did discuss that if we were to proceed now, she would not be a candidate for immediate reconstruction given that she vapes nicotine daily.  She had not previously considered the potential negative aspects of bilateral mastectomy, and will think about this over the next few months.  In the meantime, she will try to quit vaping.       A total of 60 minutes were spent on and with this patient today, including review of records, independent review of imaging, history and physical exam, counseling, documentation of exam, and coordination of care.      Problem   Family History of Breast Cancer    Mother breast cancer age 27.  MGM breast cancer age 40.     At High Risk for Breast Cancer    Lifetime (5-yr) breast cancer risk calculations:  - Dixie v8 (ANGELO): 36.3% (2.0%)    - High risk surveillance  - Genetic testing  - Consideration of chemoprevention (pt would like to think about this more before referral)     Mass of Right Axilla    Subjective right axillary mass  Diagnostic imaging negative 09/20/2024  No palpable adenopathy 11/12/2024     Fibroadenoma of Both Breasts    Right 09:00 4cmFN fibroadenoma with nodular adenosis  - US-guided CNBx 06/21/2022    Left 05:00 2cmFN fibroadenoma with sclerosing adenosis  - US-guided CNBx 09/26/2024